# Patient Record
Sex: FEMALE | Race: BLACK OR AFRICAN AMERICAN | NOT HISPANIC OR LATINO | URBAN - METROPOLITAN AREA
[De-identification: names, ages, dates, MRNs, and addresses within clinical notes are randomized per-mention and may not be internally consistent; named-entity substitution may affect disease eponyms.]

---

## 2017-05-19 ENCOUNTER — OFFICE VISIT (OUTPATIENT)
Dept: FAMILY MEDICINE | Facility: CLINIC | Age: 47
End: 2017-05-19

## 2017-05-19 VITALS
SYSTOLIC BLOOD PRESSURE: 118 MMHG | RESPIRATION RATE: 16 BRPM | TEMPERATURE: 98.3 F | HEIGHT: 64 IN | DIASTOLIC BLOOD PRESSURE: 81 MMHG | BODY MASS INDEX: 28.71 KG/M2 | OXYGEN SATURATION: 100 % | HEART RATE: 82 BPM | WEIGHT: 168.2 LBS

## 2017-05-19 DIAGNOSIS — E55.9 VITAMIN D DEFICIENCY: ICD-10-CM

## 2017-05-19 DIAGNOSIS — N93.9 ABNORMAL UTERINE BLEEDING: Primary | ICD-10-CM

## 2017-05-19 DIAGNOSIS — D50.9 MICROCYTIC ANEMIA: ICD-10-CM

## 2017-05-19 LAB
HCG UR QL: NEGATIVE
HCT VFR BLD AUTO: 33.6 % (ref 35–47)
HEMOGLOBIN: 10.4 G/DL (ref 11.7–15.7)
MCH RBC QN AUTO: 23.5 PG (ref 26.5–35)
MCHC RBC AUTO-ENTMCNC: 31 G/DL (ref 32–36)
MCV RBC AUTO: 75.9 FL (ref 78–100)
PLATELET # BLD AUTO: 280 K/UL (ref 150–450)
RBC # BLD AUTO: 4.43 M/UL (ref 3.8–5.2)
WBC # BLD AUTO: 5.2 K/UL (ref 4–11)

## 2017-05-19 RX ORDER — MEDROXYPROGESTERONE ACETATE 10 MG
10 TABLET ORAL DAILY
Qty: 10 TABLET | Refills: 0 | Status: SHIPPED | OUTPATIENT
Start: 2017-05-19 | End: 2017-05-29

## 2017-05-19 RX ORDER — FERROUS GLUCONATE 324(38)MG
324 TABLET ORAL 2 TIMES DAILY
Qty: 90 TABLET | Refills: 1 | Status: SHIPPED | OUTPATIENT
Start: 2017-05-19 | End: 2018-04-26

## 2017-05-19 RX ORDER — MEDROXYPROGESTERONE ACETATE 10 MG
10 TABLET ORAL DAILY
Qty: 10 TABLET | Refills: 0 | Status: CANCELLED | OUTPATIENT
Start: 2017-05-19

## 2017-05-19 NOTE — MR AVS SNAPSHOT
After Visit Summary   2017    Mahogany Whitman    MRN: [id number]           Patient Information     Date Of Birth          1970        Visit Information        Provider Department      2017 2:00 PM Meka Gomes MD [facility]        Today's Diagnoses     Abnormal uterine bleeding    -  1    Microcytic anemia        Vitamin D deficiency           Follow-ups after your visit        Follow-up notes from your care team     Return in about 7 days (around 2017).      Your next 10 appointments already scheduled     May 25, 2017  2:20 PM CDT   Return Visit with MD [facility] ([facility])    [address],  [address]  [place] [address]   [phone]              Who to contact     Please call your clinic at [phone] to:    Ask questions about your health    Make or cancel appointments    Discuss your medicines    Learn about your test results    Speak to your doctor   If you have compliments or concerns about an experience at your clinic, or if you wish to file a complaint, please contact [facility] Patient Relations at [phone] or email us at [email or web address]         Additional Information About Your Visit        MyChart Information     Peeriot is an electronic gateway that provides easy, online access to your medical records. With Marketo, you can request a clinic appointment, read your test results, renew a prescription or communicate with your care team.     To sign up for Peeriot visit the website at [email or web address]   You will be asked to enter the access code listed below, as well as some personal information. Please follow the directions to create your username and password.     Your access code is: [email or web address]  Expires: 2017  7:27 PM     Your access code will  in 90 days. If you need help or a new code, please contact your  "[facility] or call [phone] for assistance.        Care EveryWhere ID     This is your Care EveryWhere ID. This could be used by other organizations to access your [place] medical records  [id number]        Your Vitals Were     Pulse Temperature Respirations Height Pulse Oximetry Breastfeeding?    82 98.3  F (36.8  C) (Oral) 16 5' 4\" (162.6 cm) 100% No    BMI (Body Mass Index)                   28.87 kg/m2            Blood Pressure from Last 3 Encounters:   05/19/17 118/81   09/29/16 102/77   06/14/16 98/67    Weight from Last 3 Encounters:   05/19/17 168 lb 3.2 oz (76.3 kg)   09/29/16 164 lb 2 oz (74.4 kg)   06/14/16 172 lb (78 kg)              We Performed the Following     CBC with Plt ([facility])     HCG Qualitative Urine (UPT) ([facility])     Surgical pathology exam          Today's Medication Changes          These changes are accurate as of: 5/19/17 11:59 PM.  If you have any questions, ask your nurse or doctor.               Start taking these medicines.        Dose/Directions    cholecalciferol 1000 UNIT tablet   Commonly known as:  vitamin D   Used for:  Vitamin D deficiency   Started by:  Meka Gomes MD        Dose:  1000 Units   Take 1 tablet (1,000 Units) by mouth daily   Quantity:  100 tablet   Refills:  3         These medicines have changed or have updated prescriptions.        Dose/Directions    * medroxyPROGESTERone 10 MG tablet   Commonly known as:  PROVERA   This may have changed:  Another medication with the same name was added. Make sure you understand how and when to take each.   Used for:  Abnormal uterine bleeding   Changed by:  Luisa Zepeda MD        Dose:  10 mg   Take 1 tablet (10 mg) by mouth daily   Quantity:  10 tablet   Refills:  0       * medroxyPROGESTERone 10 MG tablet   Commonly known as:  PROVERA   This may have changed:  You were already taking a medication with the same name, and this prescription was added. Make sure you " understand how and when to take each.   Used for:  Abnormal uterine bleeding   Changed by:  Meka Gomes MD        Dose:  10 mg   Take 1 tablet (10 mg) by mouth daily for 10 days   Quantity:  10 tablet   Refills:  0       * Notice:  This list has 2 medication(s) that are the same as other medications prescribed for you. Read the directions carefully, and ask your doctor or other care provider to review them with you.         Where to get your medicines      These medications were sent to [facility] [place], [place] - [address] [facility] [address]     Phone:  [phone]     cholecalciferol 1000 UNIT tablet    ferrous gluconate 324 (38 FE) MG tablet    medroxyPROGESTERone 10 MG tablet                Primary Care Provider Office Phone # Fax #    Corinna Neely [phone]       [facility] [address] [address]        Thank you!     Thank you for choosing [facility]  for your care. Our goal is always to provide you with excellent care. Hearing back from our patients is one way we can continue to improve our services. Please take a few minutes to complete the written survey that you may receive in the mail after your visit with us. Thank you!             Your Updated Medication List - Protect others around you: Learn how to safely use, store and throw away your medicines at www.disposemymeds.org.          This list is accurate as of: 5/19/17 11:59 PM.  Always use your most recent med list.                   Brand Name Dispense Instructions for use    cholecalciferol 1000 UNIT tablet    vitamin D    100 tablet    Take 1 tablet (1,000 Units) by mouth daily       docusate sodium 100 MG capsule    COLACE    60 capsule    Take 1-2 capsules daily as needed for constipation       ferrous gluconate 324 (38 FE) MG tablet    FERGON    90 tablet    Take 1 tablet (324 mg) by mouth 2 times daily       *  medroxyPROGESTERone 10 MG tablet    PROVERA    10 tablet    Take 1 tablet (10 mg) by mouth daily       * medroxyPROGESTERone 10 MG tablet    PROVERA    10 tablet    Take 1 tablet (10 mg) by mouth daily for 10 days       naproxen 500 MG tablet    NAPROSYN    60 tablet    Take 1 tablet (500 mg) by mouth 2 times daily (with meals)       * Notice:  This list has 2 medication(s) that are the same as other medications prescribed for you. Read the directions carefully, and ask your doctor or other care provider to review them with you.

## 2017-05-19 NOTE — LETTER
May 22, 2017      Mahogany Up J Carlos  [address] [facility] [address]        Dear Mahogany,    Thank you for getting your care at [facility]. Please see below for your test results. Low anemia but better that last time we got your level. Take the iron tablets you have , we will repeat hemoglobin in a month. This can just be a lab visit. Call  on 6/20/17 to schedule a lab visit.     Resulted Orders   HCG Qualitative Urine (UPT) ([facility])   Result Value Ref Range    HCG Qual Urine NEGATIVE Negative   CBC with Plt ([facility])   Result Value Ref Range    WBC 5.2 4.0 - 11.0 K/uL    RBC 4.43 3.80 - 5.20 M/uL    Hemoglobin 10.4 (L) 11.7 - 15.7 g/dL    Hematocrit 33.6 (L) 35.0 - 47.0 %    MCV 75.9 (L) 78.0 - 100.0 fL    MCH 23.5 (L) 26.5 - 35.0 pg    MCHC 31.0 (L) 32.0 - 36.0 g/dL    Platelets 280.0 150.0 - 450.0 K/uL       If you have any concerns about these results please call and leave a message for me or send a MyChart message to the clinic.    Sincerely,    Meka Gomes MD

## 2017-05-19 NOTE — PROGRESS NOTES
Preceptor Attestation:   Patient seen and discussed with the resident. Assessment and plan reviewed with resident and agreed upon.   Supervising Physician:  Carlos Baron MD  [facility]

## 2017-05-22 DIAGNOSIS — D62 ANEMIA DUE TO BLOOD LOSS, ACUTE: Primary | ICD-10-CM

## 2017-05-24 LAB — COPATH REPORT: NORMAL

## 2017-05-25 ENCOUNTER — OFFICE VISIT (OUTPATIENT)
Dept: FAMILY MEDICINE | Facility: CLINIC | Age: 47
End: 2017-05-25

## 2017-05-25 VITALS
OXYGEN SATURATION: 99 % | SYSTOLIC BLOOD PRESSURE: 96 MMHG | DIASTOLIC BLOOD PRESSURE: 67 MMHG | WEIGHT: 165 LBS | RESPIRATION RATE: 20 BRPM | HEART RATE: 79 BPM | TEMPERATURE: 98.4 F | BODY MASS INDEX: 28.32 KG/M2

## 2017-05-25 DIAGNOSIS — D50.0 IRON DEFICIENCY ANEMIA DUE TO CHRONIC BLOOD LOSS: ICD-10-CM

## 2017-05-25 DIAGNOSIS — M17.0 OSTEOARTHRITIS OF BOTH KNEES, UNSPECIFIED OSTEOARTHRITIS TYPE: ICD-10-CM

## 2017-05-25 DIAGNOSIS — N93.9 ABNORMAL VAGINAL BLEEDING: Primary | ICD-10-CM

## 2017-05-25 NOTE — PATIENT INSTRUCTIONS
Here is the plan from today's visit    1. Abnormal vaginal bleeding  - Hemoglobin (HGB) ([facility]); Future  -If this happens again I will refer you to Ob gyne     2. Iron deficiency anemia due to chronic blood loss  - Hemoglobin (HGB) ([facility]); Future  -Continue your iron for 1-2 months     3. Post-traumatic osteoarthritis of both knees  - Ibuprofen as needed   -SALOMPAS as needed   - Warm packs as needed     At [facility]   Phone:  [phone] [address] [facility] [address] this location    Please call or return to clinic if your symptoms don't go away.    Follow up plan    In 3 months   Thank you for coming to [facility] today.  Lab Testing:  **If you had lab testing today and your results are reassuring or normal they will be mailed to you or sent through [employer] within 7 days.   **If the lab tests need quick action we will call you with the results.  The phone number we will call with results is # [phone] (home) . If this is not the best number please call our clinic and change the number.  Medication Refills:  If you need any refills please call your pharmacy and they will contact us.   If you need to  your refill at a new pharmacy, please contact the new pharmacy directly. The new pharmacy will help you get your medications transferred faster.   Scheduling:  If you have any concerns about today's visit or wish to schedule another appointment please call our office during normal business hours [phone] (8-5:00 M-F)  If a referral was made to a [facility] and you don't get a call from central scheduling please call [phone].  If a Mammogram was ordered for you at [facility] call [phone] to schedule or change your appointment.  If you had an XRay/CT/Ultrasound/MRI ordered the number is [phone] to schedule or change your radiology appointment.   Medical Concerns:  If you have urgent medical concerns please call  [phone] at any time of the day.

## 2017-05-25 NOTE — MR AVS SNAPSHOT
After Visit Summary   5/25/2017    Mahogany Whitman    MRN: [id number]           Patient Information     Date Of Birth          1970        Visit Information        Provider Department      5/25/2017 2:20 PM Meka Gomes MD [facility]        Today's Diagnoses     Abnormal vaginal bleeding    -  1    Iron deficiency anemia due to chronic blood loss        Post-traumatic osteoarthritis of both knees          Care Instructions    Here is the plan from today's visit    1. Abnormal vaginal bleeding  - Hemoglobin (HGB) ([facility]); Future  -If this happens again I will refer you to Ob gyne     2. Iron deficiency anemia due to chronic blood loss  - Hemoglobin (HGB) ([facility]); Future  -Continue your iron for 1-2 months     3. Post-traumatic osteoarthritis of both knees  - Ibuprofen as needed   -SALOMPAS as needed   - Warm packs as needed     At [facility]   Phone:  [phone] [facility] [address] this location    Please call or return to clinic if your symptoms don't go away.    Follow up plan    In 3 months   Thank you for coming to [facility] today.  Lab Testing:  **If you had lab testing today and your results are reassuring or normal they will be mailed to you or sent through [employer] within 7 days.   **If the lab tests need quick action we will call you with the results.  The phone number we will call with results is # [phone] (home) . If this is not the best number please call our clinic and change the number.  Medication Refills:  If you need any refills please call your pharmacy and they will contact us.   If you need to  your refill at a new pharmacy, please contact the new pharmacy directly. The new pharmacy will help you get your medications transferred faster.   Scheduling:  If you have any concerns about today's visit or wish to schedule another appointment please call our office during normal business hours  [phone] (8-5:00 M-F)  If a referral was made to a [facility] and you don't get a call from central scheduling please call [phone].  If a Mammogram was ordered for you at [facility] call [phone] to schedule or change your appointment.  If you had an XRay/CT/Ultrasound/MRI ordered the number is [phone] to schedule or change your radiology appointment.   Medical Concerns:  If you have urgent medical concerns please call [phone] at any time of the day.            Follow-ups after your visit        Additional Services     PHYSICAL THERAPY REFERRAL - EXTERNAL       At [facility]   Phone:  [phone] [address] [facility] [address] this location                  Future tests that were ordered for you today     Open Future Orders        Priority Expected Expires Ordered    Hemoglobin (HGB) ([facility]) Routine 7/17/2017 9/25/2017 5/25/2017            Who to contact     Please call your clinic at [phone] to:    Ask questions about your health    Make or cancel appointments    Discuss your medicines    Learn about your test results    Speak to your doctor   If you have compliments or concerns about an experience at your clinic, or if you wish to file a complaint, please contact [facility] Patient Relations at [phone] or email us at [email or web address]         Additional Information About Your Visit        Taykeyhart Information     Mediust is an electronic gateway that provides easy, online access to your medical records. With SeatSwapr, you can request a clinic appointment, read your test results, renew a prescription or communicate with your care team.     To sign up for Mediust visit the website at [email or web address] Textbook Timet   You will be asked to enter the access code listed below, as well as some personal information. Please follow the directions to create your username and password.     Your  access code is: [email or web address]  Expires: 2017  7:27 PM     Your access code will  in 90 days. If you need help or a new code, please contact your [facility] or call [phone] for assistance.        Care EveryWhere ID     This is your Care EveryWhere ID. This could be used by other organizations to access your [place] medical records  [id number]        Your Vitals Were     Pulse Temperature Respirations Pulse Oximetry BMI (Body Mass Index)       79 98.4  F (36.9  C) (Oral) 20 99% 28.32 kg/m2        Blood Pressure from Last 3 Encounters:   17 96/67   17 118/81   16 102/77    Weight from Last 3 Encounters:   17 165 lb (74.8 kg)   17 168 lb 3.2 oz (76.3 kg)   16 164 lb 2 oz (74.4 kg)              We Performed the Following     PHYSICAL THERAPY REFERRAL - EXTERNAL        Primary Care Provider Office Phone # Fax #    Corinna Neely [phone]       [facility] [address] [address]        Thank you!     Thank you for choosing [facility]  for your care. Our goal is always to provide you with excellent care. Hearing back from our patients is one way we can continue to improve our services. Please take a few minutes to complete the written survey that you may receive in the mail after your visit with us. Thank you!             Your Updated Medication List - Protect others around you: Learn how to safely use, store and throw away your medicines at www.disposemymeds.org.          This list is accurate as of: 17  3:10 PM.  Always use your most recent med list.                   Brand Name Dispense Instructions for use    cholecalciferol 1000 UNIT tablet    vitamin D    100 tablet    Take 1 tablet (1,000 Units) by mouth daily       docusate sodium 100 MG capsule    COLACE    60 capsule    Take 1-2 capsules daily as needed for constipation       ferrous gluconate 324  (38 FE) MG tablet    FERGON    90 tablet    Take 1 tablet (324 mg) by mouth 2 times daily       * medroxyPROGESTERone 10 MG tablet    PROVERA    10 tablet    Take 1 tablet (10 mg) by mouth daily       * medroxyPROGESTERone 10 MG tablet    PROVERA    10 tablet    Take 1 tablet (10 mg) by mouth daily for 10 days       naproxen 500 MG tablet    NAPROSYN    60 tablet    Take 1 tablet (500 mg) by mouth 2 times daily (with meals)       * Notice:  This list has 2 medication(s) that are the same as other medications prescribed for you. Read the directions carefully, and ask your doctor or other care provider to review them with you.

## 2017-05-25 NOTE — PROGRESS NOTES
Preceptor Attestation:   Patient seen and discussed with the resident. Assessment and plan reviewed with resident and agreed upon.   Supervising Physician:  Carlos Baron MD  [facility]

## 2017-06-01 NOTE — PROGRESS NOTES
HPI       Mahogany Whitman is a 46 year old  female  who presents for the following:     Vaginal Bleeding Follow up  Bleeding has stopped   I have reviewed result of the histopathology with her. Negative product of conception and malignancy     Acute on Chronic bilateral Knee   -Has been hurting past couple of weeks   -Had same symptoms in the past then went away  -No fever, has not noticed any swelling or redness  -No recent trauma     No Known Allergies    Problem, Medication and Allergy Lists were reviewed and are current.  reviewed and updated if needed..    Patient is an established patient of this clinic..         Review of Systems:   General: No distress  See HPI               Physical Exam:     Vitals:    05/25/17 1441   BP: 96/67   BP Location: Left arm   Patient Position: Chair   Cuff Size: Adult Regular   Pulse: 79   Resp: 20   Temp: 98.4  F (36.9  C)   TempSrc: Oral   SpO2: 99%   Weight: 165 lb (74.8 kg)     Body mass index is 28.32 kg/(m^2).    Constitutional: Awake, alert, cooperative, no apparent distress, and appears stated age  MSK:  Bilateral knees negative redness, no effusion, Positive bilateral medial joint line tenderness, Negative ligament laxity,   Gait normal .    No results found for this or any previous visit (from the past 24 hour(s)).  No labs done today     Assessment and Plan        1. Abnormal vaginal bleeding  - Hemoglobin (HGB) ([facility]); Future  -If this happens again I will refer you to OB gyne     2. Iron deficiency anemia due to chronic blood loss  - Hemoglobin (HGB) ([facility]); Future  -Continue your iron for 1-2 months     3. Post-traumatic osteoarthritis of both knees  - Ibuprofen as needed   -SALOMPAS as needed   - Warm packs as needed     At [facility]   Phone:  [phone] [address]  [facility] [address] this location    Please call or return to clinic if your symptoms don't go away.    Follow up plan    In 3 months     There are no  discontinued medications.    Options for treatment and follow-up care were reviewed with the patient. Mahogany Whitman  engaged in the decision making process and verbalized understanding of the options discussed and agreed with the final plan.    Meka Gomes MD G3  [facility]  Family Medicine Resident  Pager [phone]

## 2017-06-10 ENCOUNTER — HEALTH MAINTENANCE LETTER (OUTPATIENT)
Age: 47
End: 2017-06-10

## 2017-06-14 ENCOUNTER — OFFICE VISIT (OUTPATIENT)
Dept: FAMILY MEDICINE | Facility: CLINIC | Age: 47
End: 2017-06-14

## 2017-06-14 VITALS
RESPIRATION RATE: 20 BRPM | OXYGEN SATURATION: 98 % | SYSTOLIC BLOOD PRESSURE: 102 MMHG | WEIGHT: 162 LBS | DIASTOLIC BLOOD PRESSURE: 66 MMHG | TEMPERATURE: 98.3 F | BODY MASS INDEX: 27.81 KG/M2 | HEART RATE: 80 BPM

## 2017-06-14 DIAGNOSIS — N92.6 IRREGULAR MENSTRUAL CYCLE: Primary | ICD-10-CM

## 2017-06-14 DIAGNOSIS — E55.9 VITAMIN D DEFICIENCY: ICD-10-CM

## 2017-06-14 DIAGNOSIS — E56.9 VITAMIN DEFICIENCY: ICD-10-CM

## 2017-06-14 NOTE — LETTER
June 16, 2017      Mahogany Up J Carlos  [address] [facility] [address]        Dear Mahogany,    Thank you for getting your care at [facility]. Please see below for your test results. Sent high dose Vitamin D at [facility].     Resulted Orders   Vitamin D Deficiency   Result Value Ref Range    Vitamin D Deficiency screening 18 (L) 20 - 75 ug/L      Comment:      Season, race, dietary intake, and treatment affect the concentration of   25-hydroxy-Vitamin D. Values may decrease during winter months and increase   during summer months. Values 20-29 ug/L may indicate Vitamin D insufficiency   and values <20 ug/L may indicate Vitamin D deficiency.   Vitamin D determination is routinely performed by an immunoassay specific for   25 hydroxyvitamin D3.  If an individual is on vitamin D2 (ergocalciferol)   supplementation, please specify 25 OH vitamin D2 and D3 level determination   by   LCMSMS test VITD23.         If you have any concerns about these results please call and leave a message for me or send a MyChart message to the clinic.    Sincerely,    Meka Gomes MD

## 2017-06-14 NOTE — MR AVS SNAPSHOT
After Visit Summary   6/14/2017    Mahogany Whitman    MRN: [id number]           Patient Information     Date Of Birth          1970        Visit Information        Provider Department      6/14/2017 2:00 PM Meka Gomes MD [facility]        Today's Diagnoses     Irregular menstrual cycle    -  1    Vitamin deficiency          Care Instructions    Here is the plan from today's visit    1. Irregular menstrual cycle  - To figure out cause of irregular bleeding   - OB/GYN REFERRAL - INTERNAL    2. Vitamin deficiency  - Vitamin D Deficiency, will do labs   -If very low I will send a high dose Vitamin D to your pharmacy     Please call or return to clinic if your symptoms don't go away.    Follow up plan  As needed     Thank you for coming to [facility] today.  Lab Testing:  **If you had lab testing today and your results are reassuring or normal they will be mailed to you or sent through Rendeevoo within 7 days.   **If the lab tests need quick action we will call you with the results.  The phone number we will call with results is # [phone] (home) . If this is not the best number please call our clinic and change the number.  Medication Refills:  If you need any refills please call your pharmacy and they will contact us.   If you need to  your refill at a new pharmacy, please contact the new pharmacy directly. The new pharmacy will help you get your medications transferred faster.   Scheduling:  If you have any concerns about today's visit or wish to schedule another appointment please call our office during normal business hours [phone] (8-5:00 M-F)  If a referral was made to a [facility] and you don't get a call from central scheduling please call [phone].  If a Mammogram was ordered for you at [facility] call [phone] to schedule or change your appointment.  If you had an XRay/CT/Ultrasound/MRI ordered the number  is [phone] to schedule or change your radiology appointment.   Medical Concerns:  If you have urgent medical concerns please call [phone] at any time of the day.            Follow-ups after your visit        Additional Services     OB/GYN REFERRAL - INTERNAL       Your provider has referred you to:  GAY: [facility] [phone]   [email or web address]    Please be aware that coverage of these services is subject to the terms and limitations of your health insurance plan.  Call member services at your health plan with any benefit or coverage questions.      Please bring the following with you to your appointment:    (1) Any X-Rays, CTs or MRIs which have been performed.  Contact the facility where they were done to arrange for  prior to your scheduled appointment.   (2) List of current medications   (3) This referral request   (4) Any documents/labs given to you for this referral                  Who to contact     Please call your clinic at [phone] to:    Ask questions about your health    Make or cancel appointments    Discuss your medicines    Learn about your test results    Speak to your doctor   If you have compliments or concerns about an experience at your clinic, or if you wish to file a complaint, please contact [facility] Patient Relations at [phone] or email us at [email or web address]         Additional Information About Your Visit        MobileIronhart Information     Jiujiuweikangt is an electronic gateway that provides easy, online access to your medical records. With Skimble, you can request a clinic appointment, read your test results, renew a prescription or communicate with your care team.     To sign up for Jiujiuweikangt visit the website at [email or web address]   You will be asked to enter the access code listed below, as well as some personal information. Please follow the directions to  create your username and password.     Your access code is: [email or web address]  Expires: 2017  7:27 PM     Your access code will  in 90 days. If you need help or a new code, please contact your [facility] or call [phone] for assistance.        Care EveryWhere ID     This is your Care EveryWhere ID. This could be used by other organizations to access your [place] medical records  [id number]        Your Vitals Were     Pulse Temperature Respirations Last Period Pulse Oximetry Breastfeeding?    80 98.3  F (36.8  C) (Oral) 20 2017 98% No    BMI (Body Mass Index)                   27.81 kg/m2            Blood Pressure from Last 3 Encounters:   17 102/66   17 96/67   17 118/81    Weight from Last 3 Encounters:   17 162 lb (73.5 kg)   17 165 lb (74.8 kg)   17 168 lb 3.2 oz (76.3 kg)              We Performed the Following     OB/GYN REFERRAL - INTERNAL     Vitamin D Deficiency        Primary Care Provider Office Phone # Fax #    Corinna Neely [phone]       [facility] [address] [address]        Thank you!     Thank you for choosing [facility]  for your care. Our goal is always to provide you with excellent care. Hearing back from our patients is one way we can continue to improve our services. Please take a few minutes to complete the written survey that you may receive in the mail after your visit with us. Thank you!             Your Updated Medication List - Protect others around you: Learn how to safely use, store and throw away your medicines at www.disposemymeds.org.          This list is accurate as of: 17  2:20 PM.  Always use your most recent med list.                   Brand Name Dispense Instructions for use    cholecalciferol 1000 UNIT tablet    vitamin D    100 tablet    Take 1 tablet (1,000 Units) by mouth daily       docusate sodium 100  MG capsule    COLACE    60 capsule    Take 1-2 capsules daily as needed for constipation       ferrous gluconate 324 (38 FE) MG tablet    FERGON    90 tablet    Take 1 tablet (324 mg) by mouth 2 times daily       naproxen 500 MG tablet    NAPROSYN    60 tablet    Take 1 tablet (500 mg) by mouth 2 times daily (with meals)

## 2017-06-14 NOTE — PROGRESS NOTES
Preceptor Attestation:   Patient seen and discussed with the resident. Assessment and plan reviewed with resident and agreed upon.   Supervising Physician:  Steven Lion MD  [facility]

## 2017-06-14 NOTE — PATIENT INSTRUCTIONS
Here is the plan from today's visit    1. Irregular menstrual cycle  - To figure out cause of irregular bleeding   - OB/GYN REFERRAL - INTERNAL    2. Vitamin deficiency  - Vitamin D Deficiency, will do labs   -If very low I will send a high dose Vitamin D to your pharmacy     Please call or return to clinic if your symptoms don't go away.    Follow up plan  As needed     Thank you for coming to [facility] today.  Lab Testing:  **If you had lab testing today and your results are reassuring or normal they will be mailed to you or sent through Amonix within 7 days.   **If the lab tests need quick action we will call you with the results.  The phone number we will call with results is # [phone] (home) . If this is not the best number please call our clinic and change the number.  Medication Refills:  If you need any refills please call your pharmacy and they will contact us.   If you need to  your refill at a new pharmacy, please contact the new pharmacy directly. The new pharmacy will help you get your medications transferred faster.   Scheduling:  If you have any concerns about today's visit or wish to schedule another appointment please call our office during normal business hours [phone] (8-5:00 M-F)  If a referral was made to a [facility] and you don't get a call from central scheduling please call [phone].  If a Mammogram was ordered for you at [facility] call [phone] to schedule or change your appointment.  If you had an XRay/CT/Ultrasound/MRI ordered the number is [phone] to schedule or change your radiology appointment.   Medical Concerns:  If you have urgent medical concerns please call [phone] at any time of the day.      [facility]  [phone]  Hi, pt is scheduled 8/3 at [facility]           Thanks     Vernon

## 2017-06-15 LAB — DEPRECATED CALCIDIOL+CALCIFEROL SERPL-MC: 18 UG/L (ref 20–75)

## 2017-06-16 NOTE — PROGRESS NOTES
HPI       Mahogany Whitman is a 46 year old  female  who presents for the following:     Abnormal Uterine Bleeding follow up:   -Patent Initial heavy bleeding stopped after taking 7 days of progesterone last 17.   -Started Bleeding again last  , took remaining 3 progesterone last  and bleeding stopped   -No bleeding today, no abdominal pain, difficulty urinating  -Appetite , bowel movement , sleep and weight is fine.     Wants Vit D level check     Taking Iron for anemia- denies any dizziness, chest pressure, heaviness or palpitation    Problem, Medication and Allergy Lists were reviewed and are current.  reviewed and updated if needed..    Patient is an established patient of this clinic..         Review of Systems:   General: No distress  Negative except for HPI             Physical Exam:     Vitals:    17 1354   BP: 102/66   BP Location: Left arm   Patient Position: Chair   Cuff Size: Adult Regular   Pulse: 80   Resp: 20   Temp: 98.3  F (36.8  C)   TempSrc: Oral   SpO2: 98%   Weight: 162 lb (73.5 kg)     Body mass index is 27.81 kg/(m^2).    Constitutional: Awake, alert, cooperative, no apparent distress, and appears stated age  Psych : Good eye contact, well groomed, very interactive and collaborative. Good insight. Thought process is linear and coherent. Associations are tight. Mood is good. Affect euthymic.     No results found for this or any previous visit (from the past 24 hour(s)).  No labs done today     Assessment and Plan      1. Irregular menstrual cycle, needs atypical hyperplasia rule out   Specimen form uterus does not show atypia , Last endometrial biopsy showed the followin   COMMENT:   The findings are suggestive of exogenous hormone effect.  Clinical   correlation is recommended.  Since there are areas of glandular crowding   that are not sufficient for a diagnosis of simple hyperplasia in a   background of secretory changes, follow up is recommended.   Should the   bleeding persist, repeat biopsy during the proliferative phase is   recommended especially if the patient has risk factors to develop   endometrial hyperplasia.     - To figure out cause of irregular bleeding .  - OB/GYN REFERRAL - INTERNAL    2. Vitamin deficiency  - Vitamin D Deficiency, will do labs   -If very low I will send a high dose Vitamin D to your pharmacy     Please call or return to clinic if your symptoms don't go away.    Follow up plan  As needed       Medications Discontinued During This Encounter   Medication Reason     medroxyPROGESTERone (PROVERA) 10 MG tablet Medication Reconciliation Clean Up       Options for treatment and follow-up care were reviewed with the patient. Mahogany Whitman  engaged in the decision making process and verbalized understanding of the options discussed and agreed with the final plan.    Meka Gomes MD G3  [facility]  Family Medicine Resident  Pager [phone]

## 2017-08-11 ENCOUNTER — OFFICE VISIT (OUTPATIENT)
Dept: OBGYN | Facility: CLINIC | Age: 47
End: 2017-08-11
Attending: OBSTETRICS & GYNECOLOGY
Payer: COMMERCIAL

## 2017-08-11 VITALS
SYSTOLIC BLOOD PRESSURE: 99 MMHG | HEART RATE: 96 BPM | BODY MASS INDEX: 26.72 KG/M2 | WEIGHT: 156.5 LBS | HEIGHT: 64 IN | DIASTOLIC BLOOD PRESSURE: 66 MMHG

## 2017-08-11 DIAGNOSIS — N88.8 FRIABLE CERVIX: ICD-10-CM

## 2017-08-11 DIAGNOSIS — N92.4 EXCESSIVE BLEEDING IN PREMENOPAUSAL PERIOD: ICD-10-CM

## 2017-08-11 DIAGNOSIS — N92.1 METRORRHAGIA: Primary | ICD-10-CM

## 2017-08-11 LAB
BASOPHILS # BLD AUTO: 0 10E9/L (ref 0–0.2)
BASOPHILS NFR BLD AUTO: 0.4 %
DIFFERENTIAL METHOD BLD: ABNORMAL
EOSINOPHIL # BLD AUTO: 0.1 10E9/L (ref 0–0.7)
EOSINOPHIL NFR BLD AUTO: 2.5 %
ERYTHROCYTE [DISTWIDTH] IN BLOOD BY AUTOMATED COUNT: 18.2 % (ref 10–15)
HCG UR QL: NEGATIVE
HCT VFR BLD AUTO: 31.8 % (ref 35–47)
HGB BLD-MCNC: 9.8 G/DL (ref 11.7–15.7)
IMM GRANULOCYTES # BLD: 0 10E9/L (ref 0–0.4)
IMM GRANULOCYTES NFR BLD: 0.2 %
INTERNAL QC OK POCT: YES
LYMPHOCYTES # BLD AUTO: 2.1 10E9/L (ref 0.8–5.3)
LYMPHOCYTES NFR BLD AUTO: 46.1 %
MCH RBC QN AUTO: 22.3 PG (ref 26.5–33)
MCHC RBC AUTO-ENTMCNC: 30.8 G/DL (ref 31.5–36.5)
MCV RBC AUTO: 72 FL (ref 78–100)
MONOCYTES # BLD AUTO: 0.4 10E9/L (ref 0–1.3)
MONOCYTES NFR BLD AUTO: 8.5 %
NEUTROPHILS # BLD AUTO: 1.9 10E9/L (ref 1.6–8.3)
NEUTROPHILS NFR BLD AUTO: 42.3 %
NRBC # BLD AUTO: 0 10*3/UL
NRBC BLD AUTO-RTO: 0 /100
PLATELET # BLD AUTO: 251 10E9/L (ref 150–450)
PROLACTIN SERPL-MCNC: 4 UG/L (ref 3–27)
RBC # BLD AUTO: 4.4 10E12/L (ref 3.8–5.2)
TSH SERPL DL<=0.005 MIU/L-ACNC: 0.78 MU/L (ref 0.4–4)
WBC # BLD AUTO: 4.5 10E9/L (ref 4–11)

## 2017-08-11 PROCEDURE — 85025 COMPLETE CBC W/AUTO DIFF WBC: CPT | Performed by: NURSE PRACTITIONER

## 2017-08-11 PROCEDURE — 84443 ASSAY THYROID STIM HORMONE: CPT | Performed by: NURSE PRACTITIONER

## 2017-08-11 PROCEDURE — 87624 HPV HI-RISK TYP POOLED RSLT: CPT | Performed by: NURSE PRACTITIONER

## 2017-08-11 PROCEDURE — 99212 OFFICE O/P EST SF 10 MIN: CPT | Mod: 25

## 2017-08-11 PROCEDURE — 88305 TISSUE EXAM BY PATHOLOGIST: CPT | Performed by: NURSE PRACTITIONER

## 2017-08-11 PROCEDURE — G0476 HPV COMBO ASSAY CA SCREEN: HCPCS | Performed by: NURSE PRACTITIONER

## 2017-08-11 PROCEDURE — 58100 BIOPSY OF UTERUS LINING: CPT | Mod: ZF | Performed by: NURSE PRACTITIONER

## 2017-08-11 PROCEDURE — 81025 URINE PREGNANCY TEST: CPT | Mod: ZF | Performed by: NURSE PRACTITIONER

## 2017-08-11 PROCEDURE — 84146 ASSAY OF PROLACTIN: CPT | Performed by: NURSE PRACTITIONER

## 2017-08-11 PROCEDURE — 36415 COLL VENOUS BLD VENIPUNCTURE: CPT | Performed by: NURSE PRACTITIONER

## 2017-08-11 PROCEDURE — 88175 CYTOPATH C/V AUTO FLUID REDO: CPT | Performed by: NURSE PRACTITIONER

## 2017-08-11 ASSESSMENT — PAIN SCALES - GENERAL: PAINLEVEL: NO PAIN (0)

## 2017-08-11 NOTE — PATIENT INSTRUCTIONS
Please go to the lab today after this appointment  An endometrial biopsy was done today.  You will be called with the results in 7-10 days.  Please schedule a pelvic ultrasound in 2-3 weeks for further evaluation of your bleeding.  A plan of care will be determined when this has been completed.  May take ibuprofen 400-800 mg up to three times per day as needed for cramping.  Notify provider if you have an increase in the heaviness of your bleeding or are soaking a pad in an hour.  Also notify provider if you have severe cramping or abnormal vaginal discharge.

## 2017-08-11 NOTE — MR AVS SNAPSHOT
After Visit Summary   8/11/2017    Mahogany Whitman    MRN: [id number]           Patient Information     Date Of Birth          1970        Visit Information        Provider Department      8/11/2017 2:30 PM Annelise Guillen APRN CNP [facility]        Today's Diagnoses     Metrorrhagia        Excessive bleeding in premenopausal period          Care Instructions    Please go to the lab today after this appointment  An endometrial biopsy was done today.  You will be called with the results in 7-10 days.  Please schedule a pelvic ultrasound in 2-3 weeks for further evaluation of your bleeding.  A plan of care will be determined when this has been completed.          Follow-ups after your visit        Future tests that were ordered for you today     Open Future Orders        Priority Expected Expires Ordered    US GYN Pelvic, Complete (In Clinic) [[id number]] Routine  9/25/2017 8/11/2017            Who to contact     Please call your clinic at [phone] to:    Ask questions about your health    Make or cancel appointments    Discuss your medicines    Learn about your test results    Speak to your doctor   If you have compliments or concerns about an experience at your clinic, or if you wish to file a complaint, please contact [facility] Patient Relations at [phone] or email us at [email or web address]         Additional Information About Your Visit        MyChart Information     [employer] is an electronic gateway that provides easy, online access to your medical records. With [employer], you can request a clinic appointment, read your test results, renew a prescription or communicate with your care team.     To sign up for Cheft visit the website at [email or web address]   You will be asked to enter the access code listed below, as well as some personal information. Please follow the directions to create your username and  "password.     Your access code is: [email or web address]  Expires: 2017  7:27 PM     Your access code will  in 90 days. If you need help or a new code, please contact your [facility] or call [phone] for assistance.        Care EveryWhere ID     This is your Care EveryWhere ID. This could be used by other organizations to access your [place] medical records  [id number]        Your Vitals Were     Pulse Height Last Period BMI (Body Mass Index)          96 1.626 m (5' 4.02\") 2017 26.85 kg/m2         Blood Pressure from Last 3 Encounters:   17 99/66   17 102/66   17 96/67    Weight from Last 3 Encounters:   17 71 kg (156 lb 8 oz)   17 73.5 kg (162 lb)   17 74.8 kg (165 lb)              We Performed the Following     CBC with Platelets Differential     Endometrial Biopsy without Cervical Dilation [25251]     hCG qual urine POCT     HPV High Risk Types DNA Cervical     Pap imaged thin layer diagnostic with HPV     Prolactin     Surgical pathology exam [[id number]]     TSH with free T4 reflex        Primary Care Provider Office Phone # Fax #    Corinna Karson Neely [phone]       [address] [address]        Equal Access to Services     DIEGO ARGUETA : Hadii reena musa hadasho Sorakan, waaxda luqadaha, qaybta kaalmada sari, radhames bautista . So [facility] [phone].    ATENCIÓN: Si habla español, tiene a iverson disposición servicios gratuitos de asistencia lingüística. Mima al [phone].    We comply with applicable federal civil rights laws and [place] laws. We do not discriminate on the basis of race, color, national origin, age, disability sex, sexual orientation or gender identity.            Thank you!     Thank you for choosing [facility]  for your care. Our goal is always to provide you with excellent care. Hearing back from our patients is " one way we can continue to improve our services. Please take a few minutes to complete the written survey that you may receive in the mail after your visit with us. Thank you!             Your Updated Medication List - Protect others around you: Learn how to safely use, store and throw away your medicines at www.disposemymeds.org.          This list is accurate as of: 8/11/17  3:18 PM.  Always use your most recent med list.                   Brand Name Dispense Instructions for use Diagnosis    * cholecalciferol 1000 UNIT tablet    vitamin D    100 tablet    Take 1 tablet (1,000 Units) by mouth daily    Vitamin D deficiency       * cholecalciferol 41610 UNITS capsule    VITAMIN D3    8 capsule    Take 1 capsule (50,000 Units) by mouth once a week    Vitamin D deficiency       docusate sodium 100 MG capsule    COLACE    60 capsule    Take 1-2 capsules daily as needed for constipation    Chronic constipation       ferrous gluconate 324 (38 FE) MG tablet    FERGON    90 tablet    Take 1 tablet (324 mg) by mouth 2 times daily    Abnormal uterine bleeding       * Notice:  This list has 2 medication(s) that are the same as other medications prescribed for you. Read the directions carefully, and ask your doctor or other care provider to review them with you.

## 2017-08-11 NOTE — PROGRESS NOTES
"Mahogany Whitman is a 46 yr old female, , who presents for evaluation of abnormal uterine bleeding.  She was referred from [facility].    Mahogany states that her abnormal bleeding started 1 year ago.  She notes that she has menses are regular intervals with heavy bleeding that lasts 3-4 days, changing her tampon 3-4 times per day.  She has intermenstrual bleeding for 3+ weeks between periods, though, with light and \"stringy\"/ \"sticky\" blood.  Occasionally notes the blood present in her underwear/ panty liner, but most often notes it when she sits to void. Has very few days with no bleeding at all.  Only spotting present today.  Denies dysmenorrhea or pelvic pain.  Sexually active in a monogamous relationship; denies dyspareunia.  Using condoms for contraception.  Mahogany has a prescription for Progesterone to be taken for 10 days when she has heavy bleeding; she has not been taking this regularly (last taken ~2 months ago).      Mahogany states her last \"true\" menstrual period was 2017, but she has had intermittent bleeding, most days, since then. Has not taken anything to try to resolve this.    Mahogany denies fever, SOB, dizziness, weakness, or lightheadedness.  Denies symptoms of a UTI.  Mahogany has not had a recent pelvic ultrasound.  She had an endometrial biopsy done in  due to heavy menstrual bleeding; the result showed:  FINAL DIAGNOSIS:   Uterus, endometrial biopsy:         - Inactive to weakly secretory endometrium with stromal predecidualization (see comment)        - Foci of stromal breakdown.     COMMENT:   The findings are suggestive of exogenous hormone effect.  Clinical   correlation is recommended.  Since there are areas of glandular crowding   that are not sufficient for a diagnosis of simple hyperplasia in a   background of secretory changes, follow up is recommended.  Should the   bleeding persist, repeat biopsy during the proliferative phase is   recommended especially if the patient has risk " "factors to develop   endometrial hyperplasia.     Last pap smear: 2011 NIL  CBC done on 5/19/2017: Hgb 10.4, normal platelets  - Mahogany was prescribed an iron supplement, but has not been taking it regularly    Current Outpatient Prescriptions   Medication     cholecalciferol (VITAMIN D3) 76997 UNITS capsule     ferrous gluconate (FERGON) 324 (38 FE) MG tablet     cholecalciferol (VITAMIN D) 1000 UNIT tablet     docusate sodium (COLACE) 100 MG capsule     No current facility-administered medications for this visit.      Past Medical History:   Diagnosis Date     Gestational diabetes      Past Surgical History:   Procedure Laterality Date     NO HISTORY OF SURGERY       OBJECTIVE:  BP 99/66 (BP Location: Left arm, Patient Position: Chair, Cuff Size: Adult Regular)  Pulse 96  Ht 1.626 m (5' 4.02\")  Wt 71 kg (156 lb 8 oz)  LMP 06/14/2017  BMI 26.85 kg/m2  General: pleasant female in no acute distress  Abdomen: soft, non-tender  Pelvic exam: infibulation; vagina and vulva without lesion; scant amount of brown-colored discharge present in vagina; cervix pale, friable with placement of speculum; without lesions or tenderness, uterus normal size, non-tender, adenxa normal in size without tenderness, pap smear done.    UPT: negative    ASSESSMENT:  Metrorrhagia  Menorrhagia (premenopausal)  Friable cervix    PLAN:  Endometrial biopsy recommended today; patient agreeable with this plan.  Labs ordered for evaluation of dysfunctional uterine bleeding: TSH, Prolactin, CBC, UPT (negative)  Pap smear (co-testing) completed today due to friability of cervix with placement of the speculum and last pap smear being done in 2011 (cytology alone, no HPV testing done at that time).  Patient to schedule a transvaginal ultrasound in 2 -3 weeks for further evaluation of cause of bleeding  Recommended that Mahogany start a hormonal method taken regularly to decrease her bleeding; patient declines at this time.  States she is not bothered by " "the amount of bleeding, but desires to understand the cause.  She is not interested in taking hormones at this time.  Patient agreed to revisit this discussion and the plan for care after her biopsy and ultrasound results are available.      Endometrial Biopsy    Menstrual History:  Menstrual History 2016   LAST MENSTRUAL PERIOD 2016     Time Out - \"Pause for the Cause\"  Just before the procedure begins, through verbal and active participation of team members, verify:                      Initials   Patient Name SLP   Patient  SLP   Procedure to be performed SLP                                                                                                                                   Indication: menometrorhagia    Pregnancy test:  Negative    Consent: Verbal consent obtained from patient. , Risks, benefits of treatment, and no treatment were discussed.  Patient's questions were elicited and answered. , Written consent signed and scanned into medical record. and Patient received and verbalized understanding of discharge instructions    Using a small  Wendy speculum, the cervix was visualized. The cervix was prepped with Betadine.  A single tooth tenaculum was applied to the anterior lip of the cervix at the 12 o'clock position. The endometrial pipelle was advanced through the cervix without difficulty and a sample collected. One additional pass was made.  The tenaculum was removed from the cervix and the tenaculum site made hemostatic with pressure.    EBL: minimal    Complications:  none  Pathology: EMB sample was sent to pathology.  Tolerance of Procedure:  Patient did tolerate the procedure well. Patient was provided with ibuprofen after the procedure for uterine cramping     Patient was instructed to call if she experiences any heavy bleeding, severe cramping, or abnormal vaginal discharge.  May take ibuprofen 400-800 mg PO TID PRN or naproxen 500 mg PO " BID for cramping.   Will notify patient of results.  See further plan for care above.    Annelise Guillen, HERSON, APRN, WHNP

## 2017-08-11 NOTE — LETTER
"2017       RE: Mahogany Whitman  [address] [facility] [address]     Dear Colleague,    Thank you for referring your patient, Mahogany Whitman, to the [facility] at [facility]. Please see a copy of my visit note below.    Mahogany Whitman is a 46 yr old female, , who presents for evaluation of abnormal uterine bleeding.  She was referred from [facility].    Mahogany states that her abnormal bleeding started 1 year ago.  She notes that she has menses are regular intervals with heavy bleeding that lasts 3-4 days, changing her tampon 3-4 times per day.  She has intermenstrual bleeding for 3+ weeks between periods, though, with light and \"stringy\"/ \"sticky\" blood.  Occasionally notes the blood present in her underwear/ panty liner, but most often notes it when she sits to void. Has very few days with no bleeding at all.  Only spotting present today.  Denies dysmenorrhea or pelvic pain.  Sexually active in a monogamous relationship; denies dyspareunia.  Using condoms for contraception.  Mahogany has a prescription for Progesterone to be taken for 10 days when she has heavy bleeding; she has not been taking this regularly (last taken ~2 months ago).      Mahogany states her last \"true\" menstrual period was 2017, but she has had intermittent bleeding, most days, since then. Has not taken anything to try to resolve this.    Mahogany denies fever, SOB, dizziness, weakness, or lightheadedness.  Denies symptoms of a UTI.  Mahogany has not had a recent pelvic ultrasound.  She had an endometrial biopsy done in  due to heavy menstrual bleeding; the result showed:  FINAL DIAGNOSIS:   Uterus, endometrial biopsy:         - Inactive to weakly secretory endometrium with stromal predecidualization (see comment)        - Foci of stromal breakdown.     COMMENT:   The findings are suggestive of exogenous hormone effect.  Clinical   correlation is " "recommended.  Since there are areas of glandular crowding   that are not sufficient for a diagnosis of simple hyperplasia in a   background of secretory changes, follow up is recommended.  Should the   bleeding persist, repeat biopsy during the proliferative phase is   recommended especially if the patient has risk factors to develop   endometrial hyperplasia.     Last pap smear: 2011 NIL  CBC done on 5/19/2017: Hgb 10.4, normal platelets  - Deqa was prescribed an iron supplement, but has not been taking it regularly    Current Outpatient Prescriptions   Medication     cholecalciferol (VITAMIN D3) 30539 UNITS capsule     ferrous gluconate (FERGON) 324 (38 FE) MG tablet     cholecalciferol (VITAMIN D) 1000 UNIT tablet     docusate sodium (COLACE) 100 MG capsule     No current facility-administered medications for this visit.      Past Medical History:   Diagnosis Date     Gestational diabetes      Past Surgical History:   Procedure Laterality Date     NO HISTORY OF SURGERY       OBJECTIVE:  BP 99/66 (BP Location: Left arm, Patient Position: Chair, Cuff Size: Adult Regular)  Pulse 96  Ht 1.626 m (5' 4.02\")  Wt 71 kg (156 lb 8 oz)  LMP 06/14/2017  BMI 26.85 kg/m2  General: pleasant female in no acute distress  Abdomen: soft, non-tender  Pelvic exam: infibulation; vagina and vulva without lesion; scant amount of brown-colored discharge present in vagina; cervix pale, friable with placement of speculum; without lesions or tenderness, uterus normal size, non-tender, adenxa normal in size without tenderness, pap smear done.    UPT: negative    ASSESSMENT:  Metrorrhagia  Menorrhagia (premenopausal)  Friable cervix    PLAN:  Endometrial biopsy recommended today; patient agreeable with this plan.  Labs ordered for evaluation of dysfunctional uterine bleeding: TSH, Prolactin, CBC, UPT (negative)  Pap smear (co-testing) completed today due to friability of cervix with placement of the speculum and last pap smear being done " "in  (cytology alone, no HPV testing done at that time).  Patient to schedule a transvaginal ultrasound in 2 -3 weeks for further evaluation of cause of bleeding  Recommended that Deqa start a hormonal method taken regularly to decrease her bleeding; patient declines at this time.  States she is not bothered by the amount of bleeding, but desires to understand the cause.  She is not interested in taking hormones at this time.  Patient agreed to revisit this discussion and the plan for care after her biopsy and ultrasound results are available.      Endometrial Biopsy    Menstrual History:  Menstrual History 2016   LAST MENSTRUAL PERIOD 2016     Time Out - \"Pause for the Cause\"  Just before the procedure begins, through verbal and active participation of team members, verify:                      Initials   Patient Name SLP   Patient  SLP   Procedure to be performed SLP                                                                                                                                   Indication: menometrorhagia    Pregnancy test:  Negative    Consent: Verbal consent obtained from patient. , Risks, benefits of treatment, and no treatment were discussed.  Patient's questions were elicited and answered. , Written consent signed and scanned into medical record. and Patient received and verbalized understanding of discharge instructions    Using a small  Wendy speculum, the cervix was visualized. The cervix was prepped with Betadine.  A single tooth tenaculum was applied to the anterior lip of the cervix at the 12 o'clock position. The endometrial pipelle was advanced through the cervix without difficulty and a sample collected. One additional pass was made.  The tenaculum was removed from the cervix and the tenaculum site made hemostatic with pressure.    EBL: minimal    Complications:  none  Pathology: EMB sample was sent to pathology.  Tolerance of " Procedure:  Patient did tolerate the procedure well. Patient was provided with ibuprofen after the procedure for uterine cramping     Patient was instructed to call if she experiences any heavy bleeding, severe cramping, or abnormal vaginal discharge.  May take ibuprofen 400-800 mg PO TID PRN or naproxen 500 mg PO BID for cramping.   Will notify patient of results.  See further plan for care above.    Annelise Guillen, HERSON, APRN, WHNP

## 2017-08-16 ENCOUNTER — TELEPHONE (OUTPATIENT)
Dept: OBGYN | Facility: CLINIC | Age: 47
End: 2017-08-16

## 2017-08-16 DIAGNOSIS — D64.9 ANEMIA, UNSPECIFIED TYPE: Primary | ICD-10-CM

## 2017-08-16 LAB
COPATH REPORT: NORMAL
COPATH REPORT: NORMAL
PAP: NORMAL

## 2017-08-16 RX ORDER — FERROUS SULFATE 325(65) MG
325 TABLET ORAL 2 TIMES DAILY
Qty: 60 TABLET | Refills: 1 | Status: SHIPPED | OUTPATIENT
Start: 2017-08-16 | End: 2020-02-04

## 2017-08-16 NOTE — TELEPHONE ENCOUNTER
Mahogany was notified of her endometrial biopsy result of Endometrial atypical hyperplasia.  Discussed the need for further evaluation, removal of this abnormal tissue, and the need to rule out endometrial cancer.  Mahogany expressed understanding.  She was instructed that she needs to return to clinic for a consult with one of the ObGyn physicians for discussion of the best plan for care.  Mahogany was scheduled with Dr. Gutierrez at 2:45pm on August 29th.  Mahogany expressed understanding of the importance of coming to this appointment.   All of her questions were answered prior to ending this call.  Annelise Guillen, DNP, APRN, WHNP

## 2017-08-16 NOTE — TELEPHONE ENCOUNTER
Reviewed Demarko's lab results with her.  Normal Prolactin and TSH. Hgb is 9.8.  Jose Aa was instructed to take ferrous sulfate 1 tablet per day for 1 week, followed by 2 tablets per day, as tolerated, thereafter.  Rx sent to patient's preferred pharmacy.  Patient was notified of potential side effects and the need to drink adequate fluids to prevent constipation.    Annelise Guillen, HERSON, APRN, WHNP

## 2017-08-17 LAB
FINAL DIAGNOSIS: NORMAL
HPV HR 12 DNA CVX QL NAA+PROBE: NEGATIVE
HPV16 DNA SPEC QL NAA+PROBE: NEGATIVE
HPV18 DNA SPEC QL NAA+PROBE: NEGATIVE
SPECIMEN DESCRIPTION: NORMAL

## 2017-08-18 ENCOUNTER — TELEPHONE (OUTPATIENT)
Dept: OBGYN | Facility: CLINIC | Age: 47
End: 2017-08-18

## 2017-08-18 DIAGNOSIS — N85.02 ENDOMETRIAL HYPERPLASIA WITH ATYPIA: Primary | ICD-10-CM

## 2017-08-18 NOTE — TELEPHONE ENCOUNTER
----- Message from Jovana Hudson LPN sent at 8/18/2017 10:53 AM CDT -----  Consulted with Dr. Gutierrez- she is to keep 9-1-2017 pelvic ultrasound appointment.  Please inform this to patient as well.  ----- Message -----     From: Jovana Hudson LPN     Sent: 8/18/2017   9:56 AM       To: [facility]    Made appointment for patient 9- gyn onc  At 11:45am  Left message- waiting for call back  ----- Message -----     From: Christine Gutierrez MD     Sent: 8/18/2017   9:22 AM       To: [facility]  [facility], #    Hello--    This patient got scheduled to see me for follow up for endometrial hyperplasia, but she actually needs to see gyn onc. Can you help facilitate this? I will order the consult. According to Annelise who saw her for her last visit, she is somewhat resistant to following up in general/in denial about her diagnosis- just FYI.     Thanks!    Christine

## 2017-08-21 ENCOUNTER — TELEPHONE (OUTPATIENT)
Dept: OBGYN | Facility: CLINIC | Age: 47
End: 2017-08-21

## 2017-08-21 NOTE — TELEPHONE ENCOUNTER
----- Message from Jovana Hudson LPN sent at 8/18/2017 10:53 AM CDT -----  Consulted with Dr. Gutierrez- she is to keep 9-1-2017 pelvic ultrasound appointment.  Please inform this to patient as well.  ----- Message -----     From: Jovana Hudson LPN     Sent: 8/18/2017   9:56 AM       To: [facility]    Made appointment for patient 9- gyn onc  At 11:45am  Left message- waiting for call back  ----- Message -----     From: Christine Gutierrez MD     Sent: 8/18/2017   9:22 AM       To: [facility]  [facility], #    Hello--    This patient got scheduled to see me for follow up for endometrial hyperplasia, but she actually needs to see gyn onc. Can you help facilitate this? I will order the consult. According to Annelise who saw her for her last visit, she is somewhat resistant to following up in general/in denial about her diagnosis- just FYI.     Thanks!    Christine

## 2017-08-21 NOTE — TELEPHONE ENCOUNTER
Left message for Demarko that Dr Gutierrez does want her to get a pelvic US in clinic before she sees provider at gyn/onc. Asked her to call back to schedule.

## 2017-08-23 NOTE — TELEPHONE ENCOUNTER
Tried to reach Deqa but received voicemail.  Left message to call back as ultrasound appointment is needed and to discuss appointment that was made for her at another clinic.    NOTE: nurse re-scheduled ultrasound appointment for 9/1 at 3:00pm but pt is unaware of this appointment.

## 2017-09-01 ENCOUNTER — OFFICE VISIT (OUTPATIENT)
Dept: OBGYN | Facility: CLINIC | Age: 47
End: 2017-09-01
Attending: OBSTETRICS & GYNECOLOGY
Payer: COMMERCIAL

## 2017-09-01 DIAGNOSIS — N92.4 EXCESSIVE BLEEDING IN PREMENOPAUSAL PERIOD: ICD-10-CM

## 2017-09-01 DIAGNOSIS — N92.1 METRORRHAGIA: ICD-10-CM

## 2017-09-01 PROCEDURE — 76856 US EXAM PELVIC COMPLETE: CPT | Mod: ZF

## 2017-09-01 NOTE — LETTER
9/1/2017     RE: Mahogany Whitman  [address] [address]     Dear Colleague,    Thank you for referring your patient, Mahogany Whitman, to the [facility] at [facility]. Please see a copy of my visit note below.    46 year old female with LMP 8/16/17 presents for gynecologic ultrasound indicated by metrorrhagia.  This study was done transvaginally.    Uterine findings:   Presence: Visible Size: Normal 5.7 x 6.5 x 5.2cm , bulky anterior uterus  Endometrium = 4.4 mm.   Cx length = 35.7 mm.      Flexion:  Anteverted Position: Midline Margins: Smooth Shape: Normal   Contour: Regular Texture: Heterogeneous Cavity: Normal Masses: Normal    Pelvic findings:    Right Adnexa: Normal   Left Adnexa: Normal   Bladder:  Normal         Cul - de - sac fluid: None    Ovarian follicles:   Right ovary:  2.9 x 2.5 x 1.0cm.   0 follicles   Left ovary:  Not visualized     Comments:  Possible adenomyosis.  PETER Calix

## 2017-09-01 NOTE — MR AVS SNAPSHOT
After Visit Summary   2017    Mhaogany Whitman    MRN: [id number]           Patient Information     Date Of Birth          1970        Visit Information        Provider Department      2017 3:00 PM [facility]TRAS[facility]        Today's Diagnoses     Metrorrhagia        Excessive bleeding in premenopausal period           Follow-ups after your visit        Your next 10 appointments already scheduled     Sep 18, 2017 12:00 PM CDT   (Arrive by 11:45 AM)   New Patient Visit with Lyle Rodriguez MD   [facility] ([facility])    [address] [place] [address] [phone]              Who to contact     Please call your clinic at [phone] to:    Ask questions about your health    Make or cancel appointments    Discuss your medicines    Learn about your test results    Speak to your doctor   If you have compliments or concerns about an experience at your clinic, or if you wish to file a complaint, please contact [facility] Patient Relations at [phone] or email us at [email or web address]         Additional Information About Your Visit        MyChart Information     [employer] People[employer] is an electronic gateway that provides easy, online access to your medical records. With M:Metrics, you can request a clinic appointment, read your test results, renew a prescription or communicate with your care team.     To sign up for [employer] People[employer] visit the website at [email or web address]   You will be asked to enter the access code listed below, as well as some personal information. Please follow the directions to create your username and password.     Your access code is: [email or web address]  Expires: 2017  6:30 AM     Your access code will  in 90 days. If you need help or a new code, please contact your [facility] or call  [phone] for assistance.        Care EveryWhere ID     This is your Care EveryWhere ID. This could be used by other organizations to access your [facility] medical records  [id number]        Your Vitals Were     Last Period                   06/14/2017            Blood Pressure from Last 3 Encounters:   08/11/17 99/66   06/14/17 102/66   05/25/17 96/67    Weight from Last 3 Encounters:   08/11/17 71 kg (156 lb 8 oz)   06/14/17 73.5 kg (162 lb)   05/25/17 74.8 kg (165 lb)              We Performed the Following     US GYN Pelvic, Complete (In Clinic) [[id number]]        Primary Care Provider Office Phone # Fax #    Corinna Neely [phone]       [address] [address]        Equal Access to Services     [facility]: Hadii aad ku hadasho Soomaali, waaxda luqadaha, qaybta kaalmada adeegyada, radhames garrett hayjordana bautista . So [facility] [phone].    ATENCIÓN: Si habla español, tiene a iverson disposición servicios gratuitos de asistencia lingüística. Mima al [phone].    We comply with applicable federal civil rights laws and [place] laws. We do not discriminate on the basis of race, color, national origin, age, disability sex, sexual orientation or gender identity.            Thank you!     Thank you for choosing [facility]  for your care. Our goal is always to provide you with excellent care. Hearing back from our patients is one way we can continue to improve our services. Please take a few minutes to complete the written survey that you may receive in the mail after your visit with us. Thank you!             Your Updated Medication List - Protect others around you: Learn how to safely use, store and throw away your medicines at www.disposemymeds.org.          This list is accurate as of: 9/1/17  3:39 PM.  Always use your most recent med list.                   Brand Name Dispense Instructions for use Diagnosis    *  cholecalciferol 1000 UNIT tablet    vitamin D    100 tablet    Take 1 tablet (1,000 Units) by mouth daily    Vitamin D deficiency       * cholecalciferol 56175 UNITS capsule    VITAMIN D3    8 capsule    Take 1 capsule (50,000 Units) by mouth once a week    Vitamin D deficiency       docusate sodium 100 MG capsule    COLACE    60 capsule    Take 1-2 capsules daily as needed for constipation    Chronic constipation       ferrous gluconate 324 (38 FE) MG tablet    FERGON    90 tablet    Take 1 tablet (324 mg) by mouth 2 times daily    Abnormal uterine bleeding       ferrous sulfate 325 (65 FE) MG tablet    IRON    60 tablet    Take 1 tablet (325 mg) by mouth 2 times daily    Anemia, unspecified type       * Notice:  This list has 2 medication(s) that are the same as other medications prescribed for you. Read the directions carefully, and ask your doctor or other care provider to review them with you.

## 2017-09-01 NOTE — PROGRESS NOTES
46 year old female with LMP 8/16/17 presents for gynecologic ultrasound indicated by metrorrhagia.  This study was done transvaginally.    Uterine findings:   Presence: Visible Size: Normal 5.7 x 6.5 x 5.2cm , bulky anterior uterus  Endometrium = 4.4 mm.   Cx length = 35.7 mm.      Flexion:  Anteverted Position: Midline Margins: Smooth Shape: Normal   Contour: Regular Texture: Heterogeneous Cavity: Normal Masses: Normal    Pelvic findings:    Right Adnexa: Normal   Left Adnexa: Normal   Bladder:  Normal         Cul - de - sac fluid: None    Ovarian follicles:   Right ovary:  2.9 x 2.5 x 1.0cm.     0 follicles        Left ovary:  Not visualized     Comments:  Possible adenomyosis.    PETER Calix

## 2017-09-18 ENCOUNTER — ONCOLOGY VISIT (OUTPATIENT)
Dept: ONCOLOGY | Facility: CLINIC | Age: 47
End: 2017-09-18
Attending: OBSTETRICS & GYNECOLOGY
Payer: COMMERCIAL

## 2017-09-18 VITALS
SYSTOLIC BLOOD PRESSURE: 105 MMHG | OXYGEN SATURATION: 100 % | HEIGHT: 64 IN | WEIGHT: 156 LBS | BODY MASS INDEX: 26.63 KG/M2 | HEART RATE: 81 BPM | TEMPERATURE: 97.9 F | RESPIRATION RATE: 16 BRPM | DIASTOLIC BLOOD PRESSURE: 72 MMHG

## 2017-09-18 DIAGNOSIS — E55.9 VITAMIN D DEFICIENCY: ICD-10-CM

## 2017-09-18 DIAGNOSIS — R30.0 DYSURIA: Primary | ICD-10-CM

## 2017-09-18 DIAGNOSIS — R30.0 DYSURIA: ICD-10-CM

## 2017-09-18 DIAGNOSIS — N85.02 ENDOMETRIAL HYPERPLASIA WITH ATYPIA: Primary | ICD-10-CM

## 2017-09-18 LAB
ALBUMIN UR-MCNC: NEGATIVE MG/DL
APPEARANCE UR: ABNORMAL
BACTERIA #/AREA URNS HPF: ABNORMAL /HPF
BILIRUB UR QL STRIP: NEGATIVE
COLOR UR AUTO: YELLOW
GLUCOSE UR STRIP-MCNC: NEGATIVE MG/DL
HGB UR QL STRIP: NEGATIVE
KETONES UR STRIP-MCNC: NEGATIVE MG/DL
LEUKOCYTE ESTERASE UR QL STRIP: ABNORMAL
MUCOUS THREADS #/AREA URNS LPF: PRESENT /LPF
NITRATE UR QL: NEGATIVE
PH UR STRIP: 5 PH (ref 5–7)
RBC #/AREA URNS AUTO: 3 /HPF (ref 0–2)
SOURCE: ABNORMAL
SP GR UR STRIP: 1 (ref 1–1.03)
SQUAMOUS #/AREA URNS AUTO: 3 /HPF (ref 0–1)
UROBILINOGEN UR STRIP-MCNC: 0 MG/DL (ref 0–2)
WBC #/AREA URNS AUTO: 3 /HPF (ref 0–2)

## 2017-09-18 PROCEDURE — 87086 URINE CULTURE/COLONY COUNT: CPT | Performed by: OBSTETRICS & GYNECOLOGY

## 2017-09-18 PROCEDURE — 81001 URINALYSIS AUTO W/SCOPE: CPT | Performed by: OBSTETRICS & GYNECOLOGY

## 2017-09-18 PROCEDURE — 99212 OFFICE O/P EST SF 10 MIN: CPT | Mod: ZF

## 2017-09-18 ASSESSMENT — PAIN SCALES - GENERAL: PAINLEVEL: NO PAIN (0)

## 2017-09-18 NOTE — LETTER
9/18/2017       RE: Mahogany Whitman  [address] [facility] [address]     Dear Colleague,    Thank you for referring your patient, Mahogany Whitman, to the [facility]. Please see a copy of my visit note below.    Patient is not able to have a male provider, we have her follow up with a female gyn oncologist.    Lyle Rodriguez MD, MS    Department of Obstetrics and Gynecology   Division of Gynecologic Oncology   [facility]  Phone: [phone]      Again, thank you for allowing me to participate in the care of your patient.      Sincerely,    Lyle Rodriguez MD

## 2017-09-18 NOTE — PROGRESS NOTES
Patient is not able to have a male provider, we have her follow up with a female gyn oncologist.    Lyle Rodriguez MD, MS    Department of Obstetrics and Gynecology   Division of Gynecologic Oncology   [facility]  Phone: [phone]

## 2017-09-18 NOTE — MR AVS SNAPSHOT
"              After Visit Summary   9/18/2017    Mahogany Whitman    MRN: [id number]           Patient Information     Date Of Birth          1970        Visit Information        Provider Department      9/18/2017 12:00 PM Lyle Rodriguez MD [facility]        Today's Diagnoses     Endometrial hyperplasia with atypia    -  1       Follow-ups after your visit        Future tests that were ordered for you today     Open Future Orders        Priority Expected Expires Ordered    UA with Microscopic reflex to Culture Routine  9/18/2018 9/18/2017            Who to contact     If you have questions or need follow up information about today's clinic visit or your schedule please contact [facility] directly at [phone].  Normal or non-critical lab and imaging results will be communicated to you by [employer], letter or phone within 4 business days after the clinic has received the results. If you do not hear from us within 7 days, please contact the clinic through [employer] or phone. If you have a critical or abnormal lab result, we will notify you by phone as soon as possible.  Submit refill requests through [employer] or call your pharmacy and they will forward the refill request to us. Please allow 3 business days for your refill to be completed.          Additional Information About Your Visit        MyChart Information     [employer] lets you send messages to your doctor, view your test results, renew your prescriptions, schedule appointments and more. To sign up, go to [email or web address] . Click on \"Log in\" on the left side of the screen, which will take you to the Welcome page. Then click on \"Sign up Now\" on the right side of the page.     You will be asked to enter the access code listed below, as well as some personal information. Please follow the directions to create your username and password.     Your access code is: [email or web address]  Expires: 11/22/2017  6:30 AM   " "  Your access code will  in 90 days. If you need help or a new code, please call your [facility] clinic or [phone].        Care EveryWhere ID     This is your Care EveryWhere ID. This could be used by other organizations to access your [facility] medical records  [id number]        Your Vitals Were     Pulse Temperature Respirations Height Pulse Oximetry BMI (Body Mass Index)    81 97.9  F (36.6  C) (Oral) 16 1.626 m (5' 4.02\") 100% 26.76 kg/m2       Blood Pressure from Last 3 Encounters:   17 105/72   17 99/66   17 102/66    Weight from Last 3 Encounters:   17 70.8 kg (156 lb)   17 71 kg (156 lb 8 oz)   17 73.5 kg (162 lb)              Today, you had the following     No orders found for display         Where to get your medicines      These medications were sent to [facility] [place], [place] - [address]   [address] [facility] [address]     Phone:  [phone]     cholecalciferol 35093 UNITS capsule          Primary Care Provider Office Phone # Fax #    Corinna Neely [phone]       [address] [address]        Equal Access to Services     DIEGO ARGUETA : Kisha Contreras, waaxda sheela, qaybta bryanalradhames puri. So [facility] [phone].    ATENCIÓN: Si habla español, tiene a iverson disposición servicios gratuitos de asistencia lingüística. Llame al [phone].    We comply with applicable federal civil rights laws and [place] laws. We do not discriminate on the basis of race, color, national origin, age, disability sex, sexual orientation or gender identity.            Thank you!     Thank you for choosing [facility]  for your care. Our goal is always to provide you with excellent care. Hearing back from our patients is one way we can continue to improve our services. Please take a few minutes to complete the " written survey that you may receive in the mail after your visit with us. Thank you!             Your Updated Medication List - Protect others around you: Learn how to safely use, store and throw away your medicines at www.disposemymeds.org.          This list is accurate as of: 9/18/17  1:29 PM.  Always use your most recent med list.                   Brand Name Dispense Instructions for use Diagnosis    * cholecalciferol 1000 UNIT tablet    vitamin D    100 tablet    Take 1 tablet (1,000 Units) by mouth daily    Vitamin D deficiency       * cholecalciferol 40820 UNITS capsule    VITAMIN D3    8 capsule    Take 1 capsule (50,000 Units) by mouth once a week    Vitamin D deficiency       docusate sodium 100 MG capsule    COLACE    60 capsule    Take 1-2 capsules daily as needed for constipation    Chronic constipation       ferrous gluconate 324 (38 FE) MG tablet    FERGON    90 tablet    Take 1 tablet (324 mg) by mouth 2 times daily    Abnormal uterine bleeding       ferrous sulfate 325 (65 FE) MG tablet    IRON    60 tablet    Take 1 tablet (325 mg) by mouth 2 times daily    Anemia, unspecified type       * Notice:  This list has 2 medication(s) that are the same as other medications prescribed for you. Read the directions carefully, and ask your doctor or other care provider to review them with you.

## 2017-09-19 LAB
BACTERIA SPEC CULT: NORMAL
Lab: NORMAL
SPECIMEN SOURCE: NORMAL

## 2017-09-21 DIAGNOSIS — R30.0 DYSURIA: Primary | ICD-10-CM

## 2017-09-21 RX ORDER — CIPROFLOXACIN 500 MG/1
500 TABLET, FILM COATED ORAL 2 TIMES DAILY
Qty: 6 TABLET | Refills: 0 | Status: SHIPPED | OUTPATIENT
Start: 2017-09-21 | End: 2020-02-04

## 2017-10-05 ENCOUNTER — ONCOLOGY VISIT (OUTPATIENT)
Dept: ONCOLOGY | Facility: CLINIC | Age: 47
End: 2017-10-05
Attending: OBSTETRICS & GYNECOLOGY
Payer: COMMERCIAL

## 2017-10-05 VITALS
HEIGHT: 64 IN | DIASTOLIC BLOOD PRESSURE: 77 MMHG | RESPIRATION RATE: 16 BRPM | TEMPERATURE: 96.4 F | HEART RATE: 85 BPM | OXYGEN SATURATION: 100 % | WEIGHT: 155.3 LBS | SYSTOLIC BLOOD PRESSURE: 118 MMHG | BODY MASS INDEX: 26.51 KG/M2

## 2017-10-05 DIAGNOSIS — N93.9 ABNORMAL VAGINAL BLEEDING: ICD-10-CM

## 2017-10-05 DIAGNOSIS — N93.9 ABNORMAL VAGINAL BLEEDING: Primary | ICD-10-CM

## 2017-10-05 LAB — HGB BLD-MCNC: 10.6 G/DL (ref 11.7–15.7)

## 2017-10-05 PROCEDURE — 99212 OFFICE O/P EST SF 10 MIN: CPT | Mod: ZF

## 2017-10-05 PROCEDURE — 85018 HEMOGLOBIN: CPT | Performed by: OBSTETRICS & GYNECOLOGY

## 2017-10-05 PROCEDURE — 99204 OFFICE O/P NEW MOD 45 MIN: CPT | Mod: ZP | Performed by: OBSTETRICS & GYNECOLOGY

## 2017-10-05 PROCEDURE — 36415 COLL VENOUS BLD VENIPUNCTURE: CPT | Performed by: OBSTETRICS & GYNECOLOGY

## 2017-10-05 ASSESSMENT — PAIN SCALES - GENERAL: PAINLEVEL: NO PAIN (0)

## 2017-10-05 NOTE — LETTER
"10/5/2017     RE: Mahogany Whitman  [address] [facility] [address]     Dear Colleague,    Thank you for referring your patient, Mahogany Whitman, to the [facility]. Please see a copy of my visit note below.                            Consult Notes on Referred Patient    Date: 10/5/2017       Dr. Christine Gutierrez MD  [address] [place] [address]       RE: Mahogany Whitman  : 1970  SAVANNAH: 10/5/2017     Dear Dr. Christine Gutierrez:    I had the pleasure of seeing your patient Mahogany Whitman here at the Gynecologic Cancer Clinic at the [facility] on 10/5/2017.  As you know she is a very pleasant 46 year old woman with a recent diagnosis of Complex hyperplasia with atypia.  Given these findings she was subsequently sent to the Gynecologic Cancer Clinic for new patient consultation.     Brief History:  Mahogany Whitman is a 46 yr old female, , who presented originall for evaluation of abnormal uterine bleeding.    Mahogany states that her abnormal bleeding started 1 year ago.  She notes that she has menses are regular intervals with heavy bleeding that lasts 3-4 days, changing her tampon 3-4 times per day.  She has intermenstrual bleeding for 3+ weeks between periods, though, with light and \"stringy\"/ \"sticky\" blood. Mahogany states her LMP was 2017, but she has had intermittent bleeding, most days, since then.     Today she denies vaginal bleeding. She denies any bowel or urinary issues. No abdominal pain or distention. No pelvic pain or back pain. No fever or chills. No nausea or vomiting. No chest pain or SOB. She is eating and drinking well.     2017:   FINAL DIAGNOSIS:   Uterus, endometrial biopsy:         - Inactive to weakly secretory endometrium with stromal predecidualization (see comment)        - Foci of stromal breakdown.     COMMENT:   The findings are suggestive of exogenous hormone effect.  Clinical "   correlation is recommended.  Since there are areas of glandular crowding   that are not sufficient for a diagnosis of simple hyperplasia in a   background of secretory changes, follow up is recommended.  Should the   bleeding persist, repeat biopsy during the proliferative phase is   recommended especially if the patient has risk factors to develop   endometrial hyperplasia.    9/1/17: Pelvic US   Uterine findings:                        Presence: Visible Size: Normal 5.7 x 6.5 x 5.2cm , bulky anterior uterus  Endometrium = 4.4 mm.                        Cx length = 35.7 mm.                            Flexion:  Anteverted              Position: Midline                    Margins: Smooth                   Shape: Normal                        Contour: Regular                  Texture: Heterogeneous                   Cavity: Normal                      Masses: Normal     Pelvic findings:                         Right Adnexa: Normal                        Left Adnexa: Normal                        Bladder:  Normal                                                                                                             Cul - de - sac fluid: None     Ovarian follicles:                        Right ovary:  2.9 x 2.5 x 1.0cm.                        0 follicles                        Left ovary:  Not visualized       Last pap smear: 2011 NIL  CBC done on 5/19/2017: Hgb 10.4, normal platelets  - Deqa was prescribed an iron supplement, but has not been taking it regularly    Review of Systems:  Systemic           no weight changes; no fever; no chills; no night sweats; no appetite changes  Skin           no rashes, or lesions  Eye           no irritation; no changes in vision  Harinder-Laryngeal           no dysphagia; no hoarseness   Pulmonary    no cough; no shortness of breath  Cardiovascular    no chest pain; no palpitations  Gastrointestinal    no diarrhea; no constipation; no abdominal pain; no changes in bowel  habits;  no blood in stool  Genitourinary   no urinary frequency; no urinary urgency; no dysuria; no pain; no abnormal vaginal discharge; no abnormal vaginal bleeding  Breast   no breast discharge; no breast changes; no breast pain  Musculoskeletal    no myalgias; no arthralgias; no back pain  Psychiatric           no depressed mood; no anxiety    Hematologic           no tender lymph nodes; no noticeable swellings or lumps   Endocrine    no hot flashes; no heat/cold intolerance         Neurological   no tremor; no numbness and tingling; no headaches; no difficulty  sleeping    I have reviewed and addressed the patient's review of symptoms for today's visit.     Past Medical History:    Past Medical History:   Diagnosis Date     Gestational diabetes        Past Surgical History:    Past Surgical History:   Procedure Laterality Date     NO HISTORY OF SURGERY         Health Maintenance:  Health Maintenance Due   Topic Date Due     LIPID SCREEN Q5 YR FEMALE (SYSTEM ASSIGNED)  10/21/2016     INFLUENZA VACCINE (SYSTEM ASSIGNED)  09/01/2017       Last Pap Smear:   Lab Results   Component Value Date    PAP NIL 08/11/2017   She has not had a history of abnormal Pap smears.    Last Mammogram: none    Last Colonoscopy: None                     Last Thyroid Screening:        TSH   Date Value Ref Range Status   08/11/2017 0.78 0.40 - 4.00 mU/L Final       Last Cholest Screening:        LDL Cholesterol Direct (mg/dL)   Date Value   03/17/2011 70       Current Medications:     has a current medication list which includes the following prescription(s): ciprofloxacin, cholecalciferol, ferrous sulfate, docusate sodium, ferrous gluconate, and cholecalciferol.     Allergies:      No Known Allergies      Social History:     Social History   Substance Use Topics     Smoking status: Never Smoker     Smokeless tobacco: Never Used     Alcohol use No       History   Drug Use No       Family History:     Family History   Problem Relation Age of Onset  "    Asthma No family hx of      C.A.D. No family hx of      DIABETES No family hx of      Hypertension No family hx of      Breast Cancer No family hx of      Cancer - colorectal No family hx of        Physical Exam:     /77  Pulse 85  Temp 96.4  F (35.8  C) (Oral)  Resp 16  Ht 1.626 m (5' 4.02\")  Wt 70.4 kg (155 lb 4.8 oz)  SpO2 100%  BMI 26.64 kg/m2  Body mass index is 26.64 kg/(m^2).    General Appearance: healthy and alert, no distress     Psychiatric: appropriate mood and affect                               :   Declined exam at this time.  Assessment:    Mahogany Whitman is a 46 year old woman with a new diagnosis of Complex hyperplasia with atypia.       Plan:    1.)   Complex hyperplasia with atypia: treatment options were discussed with patient and son in detail. Options included TLH-BSO, treatment with oral progesterone or Mirena IUD. Pt declined all intervention at this time. Would like to discuss options with family and RTC in 2 weeks to make decision regarding treatment. Patient also requested HGB check today. Risk for cancer explained to patient and son in detail. Reviewed signs and symptoms for when she should contact the clinic or seek additional care. Patient to contact the clinic with any questions or concerns in the interim.     2.) Genetic risk factors were assessed and the patient does not meet the qualifications for a referral.      3.) Labs and/or tests ordered include:  CBC.     4.) Health maintenance issues addressed today include none.      Thank you for allowing us to participate in the care of your patient.       Sincerely,    Dominique Gomez MD    Department of Ob/Gyn and Women's Health  Division of Gynecologic Oncology  [facility]  [phone]     Of a 45 minute appointment, more than 50% was spent in counseling the patient regarding risks of retaining uterus including 30-40% chance of finding endometrial cancer at the " time of the surgery. Patient elicited understanding of the above as did her son.      CC  Patient Care Team:  Corinna Neely MD as PCP - General (Family Practice)  Rebecca Truong MD as Resident (Student in organized health care education/training program)  Vinod Mock MD as MD (Family Practice)  Meka Gomes MD as Referring Physician ([facility])  Padmini Garcia MD as MD (OB/Gyn)  SONNY FLORIAN Morgan Faulkner, am serving as a scribe to document services personally performed by Dominique Gomez MD, based upon my observations and the provider's statements to me. All documentation has been reviewed by the aforementioned doctor prior to being entered into the official medical record.     I have reviewed the above note and agree with the scribe's notation as written.    Again, thank you for allowing me to participate in the care of your patient.      Sincerely,    Dominique Gomez MD

## 2017-10-05 NOTE — MR AVS SNAPSHOT
"              After Visit Summary   10/5/2017    Mahogany Whitman    MRN: [id number]           Patient Information     Date Of Birth          1970        Visit Information        Provider Department      10/5/2017 2:40 PM Dominique Gomez MD [facility]        Today's Diagnoses     Abnormal vaginal bleeding    -  1       Follow-ups after your visit        Your next 10 appointments already scheduled     Nov 09, 2017  2:40 PM CST   (Arrive by 2:25 PM)   Return Visit with Dominique Gomez MD   [facility] ([facility])    [address] [place] [address] [phone]              Who to contact     If you have questions or need follow up information about today's clinic visit or your schedule please contact [facility] directly at [phone].  Normal or non-critical lab and imaging results will be communicated to you by MyChart, letter or phone within 4 business days after the clinic has received the results. If you do not hear from us within 7 days, please contact the clinic through MyChart or phone. If you have a critical or abnormal lab result, we will notify you by phone as soon as possible.  Submit refill requests through [employer] or call your pharmacy and they will forward the refill request to us. Please allow 3 business days for your refill to be completed.          Additional Information About Your Visit        MyChart Information     [employer] lets you send messages to your doctor, view your test results, renew your prescriptions, schedule appointments and more. To sign up, go to [email or web address] . Click on \"Log in\" on the left side of the screen, which will take you to the Welcome page. Then click on \"Sign up Now\" on the right side of the page.     You will be asked to enter the access code listed below, as well as some personal information. Please follow the directions to " "create your username and password.     Your access code is: [email or web address]  Expires: 2017  6:30 AM     Your access code will  in 90 days. If you need help or a new code, please call your [facility] or [phone].        Care EveryWhere ID     This is your Care EveryWhere ID. This could be used by other organizations to access your [place] medical records  [id number]        Your Vitals Were     Pulse Temperature Respirations Height Pulse Oximetry BMI (Body Mass Index)    85 96.4  F (35.8  C) (Oral) 16 1.626 m (5' 4.02\") 100% 26.64 kg/m2       Blood Pressure from Last 3 Encounters:   10/05/17 118/77   17 105/72   17 99/66    Weight from Last 3 Encounters:   10/05/17 70.4 kg (155 lb 4.8 oz)   17 70.8 kg (156 lb)   17 71 kg (156 lb 8 oz)               Primary Care Provider Office Phone # Fax #    Corinna Neely [phone]       [address] [address]        Equal Access to Services     DIEGO ARGUETA : Hadii reena musa hadasho Soanahyali, waaxda luqadaha, qaybta kaalmada adeegyada, radhames pretty. So [facility] [phone].    ATENCIÓN: Si habla español, tiene a iverson disposición servicios gratuitos de asistencia lingüística. alvarado al [phone].    We comply with applicable federal civil rights laws and [place] laws. We do not discriminate on the basis of race, color, national origin, age, disability, sex, sexual orientation, or gender identity.            Thank you!     Thank you for choosing [facility]  for your care. Our goal is always to provide you with excellent care. Hearing back from our patients is one way we can continue to improve our services. Please take a few minutes to complete the written survey that you may receive in the mail after your visit with us. Thank you!             Your Updated Medication List - Protect others around you: Learn how to safely use, store and throw " away your medicines at www.disposemymeds.org.          This list is accurate as of: 10/5/17 11:59 PM.  Always use your most recent med list.                   Brand Name Dispense Instructions for use Diagnosis    * cholecalciferol 1000 UNIT tablet    vitamin D3    100 tablet    Take 1 tablet (1,000 Units) by mouth daily    Vitamin D deficiency       * cholecalciferol 57903 UNITS capsule    VITAMIN D3    8 capsule    Take 1 capsule (50,000 Units) by mouth once a week    Vitamin D deficiency       ciprofloxacin 500 MG tablet    CIPRO    6 tablet    Take 1 tablet (500 mg) by mouth 2 times daily    Dysuria       docusate sodium 100 MG capsule    COLACE    60 capsule    Take 1-2 capsules daily as needed for constipation    Chronic constipation       ferrous gluconate 324 (38 FE) MG tablet    FERGON    90 tablet    Take 1 tablet (324 mg) by mouth 2 times daily    Abnormal uterine bleeding       ferrous sulfate 325 (65 FE) MG tablet    IRON    60 tablet    Take 1 tablet (325 mg) by mouth 2 times daily    Anemia, unspecified type       * Notice:  This list has 2 medication(s) that are the same as other medications prescribed for you. Read the directions carefully, and ask your doctor or other care provider to review them with you.

## 2017-10-05 NOTE — PROGRESS NOTES
"                        Consult Notes on Referred Patient    Date: 10/5/2017       Dr. Christine Gutierrez MD  [address] [place] [address]       RE: Mahogany Whitman  : 1970  SAVANNAH: 10/5/2017     Dear Dr. Christine Gutierrez:    I had the pleasure of seeing your patient Mahogany Whitman here at the Gynecologic Cancer Clinic at the [facility] on 10/5/2017.  As you know she is a very pleasant 46 year old woman with a recent diagnosis of Complex hyperplasia with atypia.  Given these findings she was subsequently sent to the Gynecologic Cancer Clinic for new patient consultation.     Brief History:  Mahogany Whitman is a 46 yr old female, , who presented originall for evaluation of abnormal uterine bleeding.    Mahogany states that her abnormal bleeding started 1 year ago.  She notes that she has menses are regular intervals with heavy bleeding that lasts 3-4 days, changing her tampon 3-4 times per day.  She has intermenstrual bleeding for 3+ weeks between periods, though, with light and \"stringy\"/ \"sticky\" blood. Mahogany states her LMP was 2017, but she has had intermittent bleeding, most days, since then.     Today she denies vaginal bleeding. She denies any bowel or urinary issues. No abdominal pain or distention. No pelvic pain or back pain. No fever or chills. No nausea or vomiting. No chest pain or SOB. She is eating and drinking well.     2017:   FINAL DIAGNOSIS:   Uterus, endometrial biopsy:         - Inactive to weakly secretory endometrium with stromal predecidualization (see comment)        - Foci of stromal breakdown.     COMMENT:   The findings are suggestive of exogenous hormone effect.  Clinical   correlation is recommended.  Since there are areas of glandular crowding   that are not sufficient for a diagnosis of simple hyperplasia in a   background of secretory changes, follow up is recommended.  Should the   bleeding persist, repeat biopsy during the " proliferative phase is   recommended especially if the patient has risk factors to develop   endometrial hyperplasia.    9/1/17: Pelvic US   Uterine findings:                        Presence: Visible Size: Normal 5.7 x 6.5 x 5.2cm , bulky anterior uterus  Endometrium = 4.4 mm.                        Cx length = 35.7 mm.                            Flexion:  Anteverted              Position: Midline                    Margins: Smooth                   Shape: Normal                        Contour: Regular                  Texture: Heterogeneous                   Cavity: Normal                      Masses: Normal     Pelvic findings:                         Right Adnexa: Normal                        Left Adnexa: Normal                        Bladder:  Normal                                                                                                             Cul - de - sac fluid: None     Ovarian follicles:                        Right ovary:  2.9 x 2.5 x 1.0cm.                        0 follicles                        Left ovary:  Not visualized       Last pap smear: 2011 NIL  CBC done on 5/19/2017: Hgb 10.4, normal platelets  - Deqa was prescribed an iron supplement, but has not been taking it regularly    Review of Systems:  Systemic           no weight changes; no fever; no chills; no night sweats; no appetite changes  Skin           no rashes, or lesions  Eye           no irritation; no changes in vision  Harinder-Laryngeal           no dysphagia; no hoarseness   Pulmonary    no cough; no shortness of breath  Cardiovascular    no chest pain; no palpitations  Gastrointestinal    no diarrhea; no constipation; no abdominal pain; no changes in bowel  habits; no blood in stool  Genitourinary   no urinary frequency; no urinary urgency; no dysuria; no pain; no abnormal vaginal discharge; no abnormal vaginal bleeding  Breast   no breast discharge; no breast changes; no breast pain  Musculoskeletal    no myalgias; no  arthralgias; no back pain  Psychiatric           no depressed mood; no anxiety    Hematologic           no tender lymph nodes; no noticeable swellings or lumps   Endocrine    no hot flashes; no heat/cold intolerance         Neurological   no tremor; no numbness and tingling; no headaches; no difficulty  sleeping    I have reviewed and addressed the patient's review of symptoms for today's visit.     Past Medical History:    Past Medical History:   Diagnosis Date     Gestational diabetes        Past Surgical History:    Past Surgical History:   Procedure Laterality Date     NO HISTORY OF SURGERY         Health Maintenance:  Health Maintenance Due   Topic Date Due     LIPID SCREEN Q5 YR FEMALE (SYSTEM ASSIGNED)  10/21/2016     INFLUENZA VACCINE (SYSTEM ASSIGNED)  09/01/2017       Last Pap Smear:   Lab Results   Component Value Date    PAP NIL 08/11/2017   She has not had a history of abnormal Pap smears.    Last Mammogram: none    Last Colonoscopy: None                     Last Thyroid Screening:        TSH   Date Value Ref Range Status   08/11/2017 0.78 0.40 - 4.00 mU/L Final       Last Cholest Screening:        LDL Cholesterol Direct (mg/dL)   Date Value   03/17/2011 70       Current Medications:     has a current medication list which includes the following prescription(s): ciprofloxacin, cholecalciferol, ferrous sulfate, docusate sodium, ferrous gluconate, and cholecalciferol.     Allergies:      No Known Allergies      Social History:     Social History   Substance Use Topics     Smoking status: Never Smoker     Smokeless tobacco: Never Used     Alcohol use No       History   Drug Use No       Family History:     Family History   Problem Relation Age of Onset     Asthma No family hx of      C.A.D. No family hx of      DIABETES No family hx of      Hypertension No family hx of      Breast Cancer No family hx of      Cancer - colorectal No family hx of        Physical Exam:     /77  Pulse 85  Temp 96.4  F  "(35.8  C) (Oral)  Resp 16  Ht 1.626 m (5' 4.02\")  Wt 70.4 kg (155 lb 4.8 oz)  SpO2 100%  BMI 26.64 kg/m2  Body mass index is 26.64 kg/(m^2).    General Appearance: healthy and alert, no distress     Psychiatric: appropriate mood and affect                               :   Declined exam at this time.  Assessment:    Mahogany Whitman is a 46 year old woman with a new diagnosis of Complex hyperplasia with atypia.       Plan:    1.)   Complex hyperplasia with atypia: treatment options were discussed with patient and son in detail. Options included TLH-BSO, treatment with oral progesterone or Mirena IUD. Pt declined all intervention at this time. Would like to discuss options with family and RTC in 2 weeks to make decision regarding treatment. Patient also requested HGB check today. Risk for cancer explained to patient and son in detail. Reviewed signs and symptoms for when she should contact the clinic or seek additional care. Patient to contact the clinic with any questions or concerns in the interim.     2.) Genetic risk factors were assessed and the patient does not meet the qualifications for a referral.      3.) Labs and/or tests ordered include:  CBC.     4.) Health maintenance issues addressed today include none.      Thank you for allowing us to participate in the care of your patient.       Sincerely,    Dominique Gomez MD    Department of Ob/Gyn and Women's Health  Division of Gynecologic Oncology  [facility]  [phone]     Of a 45 minute appointment, more than 50% was spent in counseling the patient regarding risks of retaining uterus including 30-40% chance of finding endometrial cancer at the time of the surgery. Patient elicited understanding of the above as did her son.      CC  Patient Care Team:  Corinna Neely MD as PCP - General (Family Practice)  Rebecca Truong MD as Resident (Student in organized health care " education/training program)  Vinod Mock MD as MD (Family Practice)  Meka Gomes MD as Referring Physician (Family Practice)  Padmini Garcia MD as MD (OB/Gyn)  SONNY FLORIAN, Federico Ware, am serving as a scribe to document services personally performed by Dominique Gomez MD, based upon my observations and the provider's statements to me. All documentation has been reviewed by the aforementioned doctor prior to being entered into the official medical record.     I have reviewed the above note and agree with the scribe's notation as written.

## 2017-10-05 NOTE — NURSING NOTE
"Oncology Rooming Note    October 5, 2017 1:58 PM   Mahogany Whitman is a 46 year old female who presents for:    Chief Complaint   Patient presents with     Oncology Clinic Visit     Endometrial Hyperplasia with Atypia Matt     Initial Vitals: /77  Pulse 85  Temp 96.4  F (35.8  C) (Oral)  Resp 16  Ht 1.626 m (5' 4.02\")  Wt 70.4 kg (155 lb 4.8 oz)  SpO2 100%  BMI 26.64 kg/m2 Estimated body mass index is 26.64 kg/(m^2) as calculated from the following:    Height as of this encounter: 1.626 m (5' 4.02\").    Weight as of this encounter: 70.4 kg (155 lb 4.8 oz). Body surface area is 1.78 meters squared.  No Pain (0) Comment: Data Unavailable   No LMP recorded. Patient is not currently having periods (Reason: UNKNOWN).  Allergies reviewed: Yes  Medications reviewed: Yes    Medications: Medication refills not needed today.  Pharmacy name entered into EPIC: Data Unavailable    Clinical concerns: no new concerns     6 minutes for nursing intake (face to face time)     Sharon Hoover CMA                "

## 2017-10-16 ENCOUNTER — TRANSFERRED RECORDS (OUTPATIENT)
Dept: HEALTH INFORMATION MANAGEMENT | Facility: CLINIC | Age: 47
End: 2017-10-16

## 2018-01-25 ENCOUNTER — TELEPHONE (OUTPATIENT)
Dept: ONCOLOGY | Facility: CLINIC | Age: 48
End: 2018-01-25

## 2018-02-27 DIAGNOSIS — E55.9 VITAMIN D DEFICIENCY: ICD-10-CM

## 2018-02-28 RX ORDER — METHOCARBAMOL 750 MG/1
TABLET ORAL
Qty: 8 CAPSULE | Refills: 0 | OUTPATIENT
Start: 2018-02-28

## 2018-03-19 ENCOUNTER — TRANSFERRED RECORDS (OUTPATIENT)
Dept: HEALTH INFORMATION MANAGEMENT | Facility: CLINIC | Age: 48
End: 2018-03-19

## 2018-03-22 ENCOUNTER — TRANSFERRED RECORDS (OUTPATIENT)
Dept: HEALTH INFORMATION MANAGEMENT | Facility: CLINIC | Age: 48
End: 2018-03-22

## 2018-03-23 ENCOUNTER — TELEPHONE (OUTPATIENT)
Dept: ONCOLOGY | Facility: CLINIC | Age: 48
End: 2018-03-23

## 2018-03-23 NOTE — TELEPHONE ENCOUNTER
"Son calling reporting patient is bleeding and has been bleeding heavy for \"long time\" .  Son was asked to quantify this and he stated she has been bleeding on and off for a while and has been heavy for the last few months.  He notes she is changing her pad every 30-40 minutes  Inquired if pt was soaking the pad all the way through to which son agreed this was the case  Advised son that pt should be brought to ER at the [facility] to be evaluated   Gave him address to the hospital  Advised pt needs to brought there today so they evaluate her and make sure she does not need to be admitted  Son reports understanding    "

## 2018-03-28 ENCOUNTER — TELEPHONE (OUTPATIENT)
Dept: ONCOLOGY | Facility: CLINIC | Age: 48
End: 2018-03-28

## 2018-04-04 ENCOUNTER — TRANSFERRED RECORDS (OUTPATIENT)
Dept: HEALTH INFORMATION MANAGEMENT | Facility: CLINIC | Age: 48
End: 2018-04-04

## 2018-04-04 ENCOUNTER — TELEPHONE (OUTPATIENT)
Dept: ONCOLOGY | Facility: CLINIC | Age: 48
End: 2018-04-04

## 2018-04-04 NOTE — TELEPHONE ENCOUNTER
Pt's son called to report that pt has been soaking through a pad every hour for the last two weeks.  She is lightheaded and very fatigued.  Per Dinorah Edwards NP, pt needs to be seen in the emergency department.  Writer called pt and explain this to him.  He voiced understanding.  Writer called report to [facility] ED.

## 2018-04-05 ENCOUNTER — TELEPHONE (OUTPATIENT)
Dept: ONCOLOGY | Facility: CLINIC | Age: 48
End: 2018-04-05

## 2018-04-05 NOTE — TELEPHONE ENCOUNTER
4/5  Left message on voice mail at pt home number  Attempted to contact daughter Javier, message left on voice mail

## 2018-04-05 NOTE — TELEPHONE ENCOUNTER
Pt calling needing appt   Bleeding has decreased and is no longer heavy  No longer soaking pads  Changing 2-3 times per day  Pt is currently on provera  2 tabs BID  Was given this by ER and told to take until bleeding stops    Pt requesting appt with Dr. Gomez or another female MD  Offered appt on 4/26  Advised to call for refill of provera prior to having none left  stoping provera will cause bleeding to increase  If bleeding heavy again pt needs to go to ER at [facility]  Pt reports understanding

## 2018-04-09 NOTE — TELEPHONE ENCOUNTER
4/5/18  Spoke with pt appt scheduled for evaluation and to discuss surgery  Advised to go to ER if heavy bleeding

## 2018-04-26 ENCOUNTER — ONCOLOGY VISIT (OUTPATIENT)
Dept: ONCOLOGY | Facility: CLINIC | Age: 48
End: 2018-04-26
Attending: OBSTETRICS & GYNECOLOGY
Payer: COMMERCIAL

## 2018-04-26 VITALS
BODY MASS INDEX: 26.84 KG/M2 | HEIGHT: 64 IN | SYSTOLIC BLOOD PRESSURE: 108 MMHG | DIASTOLIC BLOOD PRESSURE: 71 MMHG | WEIGHT: 157.2 LBS | OXYGEN SATURATION: 98 % | HEART RATE: 74 BPM | TEMPERATURE: 98.7 F

## 2018-04-26 DIAGNOSIS — D64.9 ANEMIA: ICD-10-CM

## 2018-04-26 DIAGNOSIS — Z01.818 PRE-OP EXAM: Primary | ICD-10-CM

## 2018-04-26 DIAGNOSIS — Z01.818 PRE-OP EXAM: ICD-10-CM

## 2018-04-26 DIAGNOSIS — D62 ACUTE POSTHEMORRHAGIC ANEMIA: ICD-10-CM

## 2018-04-26 LAB
BASOPHILS # BLD AUTO: 0 10E9/L (ref 0–0.2)
BASOPHILS NFR BLD AUTO: 0.3 %
DIFFERENTIAL METHOD BLD: ABNORMAL
EOSINOPHIL # BLD AUTO: 0.1 10E9/L (ref 0–0.7)
EOSINOPHIL NFR BLD AUTO: 1.3 %
ERYTHROCYTE [DISTWIDTH] IN BLOOD BY AUTOMATED COUNT: 19 % (ref 10–15)
HCT VFR BLD AUTO: 35.4 % (ref 35–47)
HGB BLD-MCNC: 10.6 G/DL (ref 11.7–15.7)
IMM GRANULOCYTES # BLD: 0 10E9/L (ref 0–0.4)
IMM GRANULOCYTES NFR BLD: 0.3 %
LYMPHOCYTES # BLD AUTO: 1.3 10E9/L (ref 0.8–5.3)
LYMPHOCYTES NFR BLD AUTO: 33.1 %
MCH RBC QN AUTO: 25.9 PG (ref 26.5–33)
MCHC RBC AUTO-ENTMCNC: 29.9 G/DL (ref 31.5–36.5)
MCV RBC AUTO: 87 FL (ref 78–100)
MONOCYTES # BLD AUTO: 0.4 10E9/L (ref 0–1.3)
MONOCYTES NFR BLD AUTO: 10.3 %
NEUTROPHILS # BLD AUTO: 2.1 10E9/L (ref 1.6–8.3)
NEUTROPHILS NFR BLD AUTO: 54.7 %
NRBC # BLD AUTO: 0 10*3/UL
NRBC BLD AUTO-RTO: 0 /100
PLATELET # BLD AUTO: 373 10E9/L (ref 150–450)
RBC # BLD AUTO: 4.09 10E12/L (ref 3.8–5.2)
WBC # BLD AUTO: 3.9 10E9/L (ref 4–11)

## 2018-04-26 PROCEDURE — 85025 COMPLETE CBC W/AUTO DIFF WBC: CPT | Performed by: OBSTETRICS & GYNECOLOGY

## 2018-04-26 PROCEDURE — 36415 COLL VENOUS BLD VENIPUNCTURE: CPT | Performed by: OBSTETRICS & GYNECOLOGY

## 2018-04-26 PROCEDURE — G0463 HOSPITAL OUTPT CLINIC VISIT: HCPCS | Mod: ZF

## 2018-04-26 PROCEDURE — 99214 OFFICE O/P EST MOD 30 MIN: CPT | Mod: ZP | Performed by: OBSTETRICS & GYNECOLOGY

## 2018-04-26 RX ORDER — MEDROXYPROGESTERONE ACETATE 10 MG
TABLET ORAL
COMMUNITY
Start: 2018-03-22 | End: 2018-05-02

## 2018-04-26 ASSESSMENT — PAIN SCALES - GENERAL: PAINLEVEL: NO PAIN (0)

## 2018-04-26 NOTE — MR AVS SNAPSHOT
"              After Visit Summary   2018    Mahogany Whitman    MRN: [id number]           Patient Information     Date Of Birth          1970        Visit Information        Provider Department      2018 11:00 AM Dominique Gomez MD [facility]        Today's Diagnoses     Pre-op exam    -  1    Acute posthemorrhagic anemia        Anemia           Follow-ups after your visit        Who to contact     If you have questions or need follow up information about today's clinic visit or your schedule please contact [facility] directly at [phone].  Normal or non-critical lab and imaging results will be communicated to you by Unitywarehart, letter or phone within 4 business days after the clinic has received the results. If you do not hear from us within 7 days, please contact the clinic through Unitywarehart or phone. If you have a critical or abnormal lab result, we will notify you by phone as soon as possible.  Submit refill requests through byyd or call your pharmacy and they will forward the refill request to us. Please allow 3 business days for your refill to be completed.          Additional Information About Your Visit        MyChart Information     byyd lets you send messages to your doctor, view your test results, renew your prescriptions, schedule appointments and more. To sign up, go to [email or web address] . Click on \"Log in\" on the left side of the screen, which will take you to the Welcome page. Then click on \"Sign up Now\" on the right side of the page.     You will be asked to enter the access code listed below, as well as some personal information. Please follow the directions to create your username and password.     Your access code is: [email or web address]  Expires: 2018  6:31 AM     Your access code will  in 90 days. If you need help or a new code, please call your [place] clinic or [phone].        Care EveryWhere ID     This is " "your Care EveryWhere ID. This could be used by other organizations to access your [place] medical records  [id number]        Your Vitals Were     Pulse Temperature Height Pulse Oximetry BMI (Body Mass Index)       74 98.7  F (37.1  C) 1.626 m (5' 4.02\") 98% 26.97 kg/m2        Blood Pressure from Last 3 Encounters:   04/26/18 108/71   10/05/17 118/77   09/18/17 105/72    Weight from Last 3 Encounters:   04/26/18 71.3 kg (157 lb 3.2 oz)   10/05/17 70.4 kg (155 lb 4.8 oz)   09/18/17 70.8 kg (156 lb)              We Performed the Following     Inessa-Operative Worksheet - Dilation and Curettage, under ultrasound guidance        Primary Care Provider Office Phone # Fax #    Corinna Neely [phone]       [address] [place] [phone]        Equal Access to Services     [facility] : Hadii reena musa hadasho Soomaali, waaxda luqadaha, qaybta kaalmada adeegyada, radhames bautista . So [facility] [phone].    ATENCIÓN: Si habla español, tiene a iverson disposición servicios gratuitos de asistencia lingüística. [facility] [phone].    We comply with applicable federal civil rights laws and [place] laws. We do not discriminate on the basis of race, color, national origin, age, disability, sex, sexual orientation, or gender identity.            Thank you!     Thank you for choosing [facility]  for your care. Our goal is always to provide you with excellent care. Hearing back from our patients is one way we can continue to improve our services. Please take a few minutes to complete the written survey that you may receive in the mail after your visit with us. Thank you!             Your Updated Medication List - Protect others around you: Learn how to safely use, store and throw away your medicines at www.disposemymeds.org.          This list is accurate as of 4/26/18 11:59 PM.  Always use your most recent med list.                   Brand Name " Dispense Instructions for use Diagnosis    * cholecalciferol 1000 UNIT tablet    vitamin D3    100 tablet    Take 1 tablet (1,000 Units) by mouth daily    Vitamin D deficiency       * cholecalciferol 71652 units capsule    VITAMIN D3    8 capsule    Take 1 capsule (50,000 Units) by mouth once a week    Vitamin D deficiency       ciprofloxacin 500 MG tablet    CIPRO    6 tablet    Take 1 tablet (500 mg) by mouth 2 times daily    Dysuria       docusate sodium 100 MG capsule    COLACE    60 capsule    Take 1-2 capsules daily as needed for constipation    Chronic constipation       ferrous sulfate 325 (65 Fe) MG tablet    IRON    60 tablet    Take 1 tablet (325 mg) by mouth 2 times daily    Anemia, unspecified type       medroxyPROGESTERone 10 MG tablet    PROVERA     Take 2 tablets (20 mg) by mouth twice daily. Until bleeding stops. Then take 2 tablets (20 mg) once daily.        * Notice:  This list has 2 medication(s) that are the same as other medications prescribed for you. Read the directions carefully, and ask your doctor or other care provider to review them with you.

## 2018-04-26 NOTE — PROGRESS NOTES
"                        Consult Notes on Referred Patient    Date: 2018       Dr. Christine Gutierrez MD  [address] [place] [address]       RE: Mahogany Whitman  : 1970  SAVANNAH: 2018    Dear Dr. Christine Gutierrez:    I had the pleasure of seeing your patient Mahogany Whitman here at the Gynecologic Cancer Clinic at the [facility] on 2018.  As you know she is a very pleasant 42 year old woman (chart states age as 47, pt reports she is actually 42) with a recent diagnosis of Complex hyperplasia with atypia.     Brief History:  Mahogany Whitman is a 46 yr old female, , who presented originall for evaluation of abnormal uterine bleeding.    Mahogany states that her abnormal bleeding started 1 year ago.  She notes that she has menses are regular intervals with heavy bleeding that lasts 3-4 days, changing her tampon 3-4 times per day.  She has intermenstrual bleeding for 3+ weeks between periods, though, with light and \"stringy\"/ \"sticky\" blood. Mahogany states her LMP was 2017, but she has had intermittent bleeding, most days, since then.     2017:   SPECIMEN(S):   Endometrial biopsy     FINAL DIAGNOSIS:   ENDOMETRIUM, BIOPSY:   - Endometrial atypical hyperplasia       17: Pelvic US   Uterine findings:                        Presence: Visible Size: Normal 5.7 x 6.5 x 5.2cm , bulky anterior uterus  Endometrium = 4.4 mm.                        Cx length = 35.7 mm.                            Flexion:  Anteverted              Position: Midline                    Margins: Smooth                   Shape: Normal                        Contour: Regular                  Texture: Heterogeneous                   Cavity: Normal                      Masses: Normal     Pelvic findings:                         Right Adnexa: Normal                        Left Adnexa: Normal                        Bladder:  Normal                                                           "                                                   Cul - de - sac fluid: None     Ovarian follicles:                        Right ovary:  2.9 x 2.5 x 1.0cm.                        0 follicles                        Left ovary:  Not visualized       Last pap smear: 2011 NIL  CBC done on 5/19/2017: Hgb 10.4, normal platelets  - Mahogany was prescribed an iron supplement, but has not been taking it regularly    10/2017: [facility]  Diagnosis: disordered proliferative endometrium with focal stromal breakdown admixed with abundant blood. No evidence of hyperplasia or malignancy in specimen.     4/6/2018: endometrial biopsy  Fragments of secretory endometrium, 27-28 day, post ovulatory day 13-14. No hyperplasia or malignancy noted.     Today: Mahogany presents to clinic today feeling well. She is concerned regarding excessive vaginal bleeding. She reports vaginal bleeding starting 3/12/18, continuing until 4/23/18. She has had 2 endometrial biopsies. In October there was no hyperplasia noted. Due to contniued vaginal bleeding, I am recommending D&C hysteroscopy with Mirena IUD placement and possible polypectomy. Pt and son in agreement with recommendation. Mahogany is otherwise feeling well. She reports some fatigue, she is worried about her HGB levels which was 11.1 today. She denies chest pain or SOB, She reports some R sided flank pain and abdominal cramping associated with bleeding. No urinary issues. She is eating and drinking well.     Review of Systems:  Systemic           no weight changes; no fever; no chills; no night sweats; no appetite changes  Skin           no rashes, or lesions  Eye           no irritation; no changes in vision  Harinder-Laryngeal           no dysphagia; no hoarseness   Pulmonary    no cough; no shortness of breath  Cardiovascular    no chest pain; no palpitations  Gastrointestinal    no diarrhea; no constipation; no abdominal pain; no changes in bowel  habits; no blood in stool  Genitourinary   no  urinary frequency; no urinary urgency; no dysuria; no pain; no abnormal vaginal discharge; no abnormal vaginal bleeding  Breast   no breast discharge; no breast changes; no breast pain  Musculoskeletal    no myalgias; no arthralgias; no back pain  Psychiatric           no depressed mood; no anxiety    Hematologic           no tender lymph nodes; no noticeable swellings or lumps   Endocrine    no hot flashes; no heat/cold intolerance         Neurological   no tremor; no numbness and tingling; no headaches; no difficulty  sleeping    I have reviewed and addressed the patient's review of symptoms for today's visit.     Past Medical History:    Past Medical History:   Diagnosis Date     Gestational diabetes        Past Surgical History:    Past Surgical History:   Procedure Laterality Date     NO HISTORY OF SURGERY         Health Maintenance:  Health Maintenance Due   Topic Date Due     LIPID SCREEN Q5 YR FEMALE (SYSTEM ASSIGNED)  10/21/2016     INFLUENZA VACCINE (1) 09/01/2017       Last Pap Smear:   Lab Results   Component Value Date    PAP NIL 08/11/2017   She has not had a history of abnormal Pap smears.    Last Mammogram: none    Last Colonoscopy: None                     Last Thyroid Screening:        TSH   Date Value Ref Range Status   08/11/2017 0.78 0.40 - 4.00 mU/L Final       Last Cholest Screening:        LDL Cholesterol Direct (mg/dL)   Date Value   03/17/2011 70       Current Medications:     has a current medication list which includes the following prescription(s): cholecalciferol, cholecalciferol, ciprofloxacin, docusate sodium, ferrous gluconate, and ferrous sulfate.     Allergies:      No Known Allergies      Social History:     Social History   Substance Use Topics     Smoking status: Never Smoker     Smokeless tobacco: Never Used     Alcohol use No       History   Drug Use No       Family History:     Family History   Problem Relation Age of Onset     Asthma No family hx of      C.A.D. No family hx  "of      DIABETES No family hx of      Hypertension No family hx of      Breast Cancer No family hx of      Cancer - colorectal No family hx of        Physical Exam:     /71 (BP Location: Right arm, Patient Position: Chair, Cuff Size: Adult Regular)  Pulse 74  Temp 98.7  F (37.1  C)  Ht 1.626 m (5' 4.02\")  Wt 71.3 kg (157 lb 3.2 oz)  SpO2 98%  BMI 26.97 kg/m2  Body mass index is 26.97 kg/(m^2).    General Appearance: healthy and alert, no distress  Lungs CTA B  Heart RRR  Abdomen soft, NT, ND  Ext: No CCE     Psychiatric: appropriate mood and affect                               :   Declined exam at this time.    Assessment:    Mahogany Whitman is a 47 year old woman with a recent diagnosis of Complex hyperplasia with atypia.       Plan:    1.)   Complex hyperplasia with atypia: treatment options were discussed with patient and son in detail. Options included TLH-BSO, treatment with oral progesterone or Mirena IUD. Will plan for D&C hysteroscopy, possible polypectomy with uterine morcellator, placement of Mirena IUD. Written and verbal consent obtained today. Patient also requested HGB check today. Risk for cancer explained to patient and son in detail. Reviewed signs and symptoms for when she should contact the clinic or seek additional care. Patient to contact the clinic with any questions or concerns in the interim.     2.) Genetic risk factors were assessed and the patient does not meet the qualifications for a referral.      3.) Labs and/or tests ordered include:  CBC.     4.) Health maintenance issues addressed today include none.    5.) Patient to RTC to see me in 3 months.     6.) Pre-op teaching was completed today. Risks of surgery were discussed to include: bleeding, transfusion, infection, unintentional injury to surrounding organs/structures.       Thank you for allowing us to participate in the care of your patient.       Sincerely,    Dominique Gomez MD    Department " of Ob/Gyn and Women's Health  Division of Gynecologic Oncology  [facility]  [phone]     Of a  30 minute appointment, more than 50% was spent in counseling the patient regarding risks of retaining uterus including 30-40% chance of finding endometrial cancer at the time of the surgery. Patient elicited understanding of the above as did her son.    CC  Patient Care Team:  Corinna Neely MD as PCP - General (Family Practice)  Rebecca Truong MD as Resident (Student in organized health care education/training program)  Vinod Mock MD as MD (Family Practice)  Meka Gomes MD as Referring Physician (Family Practice)  Padmini Garcia MD as MD (OB/Gyn)  SONNY FLORIAN Morgan Faulkner, am serving as a scribe to document services personally performed by Dominique Gomez MD, based upon my observations and the provider's statements to me. All documentation has been reviewed by the aforementioned doctor prior to being entered into the official medical record.     I have reviewed the above note and agree with the scribe's notation as written.

## 2018-04-26 NOTE — NURSING NOTE
"Oncology Rooming Note    April 26, 2018 11:41 AM   Mahogany Whitman is a 47 year old female who presents for:    Chief Complaint   Patient presents with     Oncology Clinic Visit     dysfunctional uterine bleeding f/u     Initial Vitals: /71 (BP Location: Right arm, Patient Position: Chair, Cuff Size: Adult Regular)  Pulse 74  Temp 98.7  F (37.1  C)  Ht 1.626 m (5' 4.02\")  Wt 71.3 kg (157 lb 3.2 oz)  SpO2 98%  BMI 26.97 kg/m2 Estimated body mass index is 26.97 kg/(m^2) as calculated from the following:    Height as of this encounter: 1.626 m (5' 4.02\").    Weight as of this encounter: 71.3 kg (157 lb 3.2 oz). Body surface area is 1.79 meters squared.  No Pain (0) Comment: Data Unavailable   No LMP recorded. Patient is not currently having periods (Reason: UNKNOWN).  Allergies reviewed: Yes  Medications reviewed: Yes    Medications: MEDICATION REFILLS NEEDED TODAY. Provider was notified.  Pharmacy name entered into [facility]: [facility] [place], [place] - [address]    Clinical concerns:  Patricia was notified.    10 minutes for nursing intake (face to face time)     Shawn Irwin CMA              "

## 2018-04-26 NOTE — LETTER
"2018       RE: Mahogany Whitman  [address] [facility] [address]     Dear Colleague,    Thank you for referring your patient, Mahogany Whitman, to the [facility]. Please see a copy of my visit note below.                            Consult Notes on Referred Patient    Date: 2018       Dr. Christine Gutierrez MD  [address] [place] [address]       RE: Mahogany Whitman  : 1970  SAVANNAH: 2018    Dear Dr. Christine Gutierrez:    I had the pleasure of seeing your patient Mahogany Whitman here at the Gynecologic Cancer Clinic at the [facility] on 2018.  As you know she is a very pleasant 42 year old woman (chart states age as 47, pt reports she is actually 42) with a recent diagnosis of Complex hyperplasia with atypia.     Brief History:  Mahogany Whitman is a 46 yr old female, , who presented originall for evaluation of abnormal uterine bleeding.    Jose Aa states that her abnormal bleeding started 1 year ago.  She notes that she has menses are regular intervals with heavy bleeding that lasts 3-4 days, changing her tampon 3-4 times per day.  She has intermenstrual bleeding for 3+ weeks between periods, though, with light and \"stringy\"/ \"sticky\" blood. Mahogany states her LMP was 2017, but she has had intermittent bleeding, most days, since then.     2017:   SPECIMEN(S):   Endometrial biopsy     FINAL DIAGNOSIS:   ENDOMETRIUM, BIOPSY:   - Endometrial atypical hyperplasia       17: Pelvic US   Uterine findings:                        Presence: Visible Size: Normal 5.7 x 6.5 x 5.2cm , bulky anterior uterus  Endometrium = 4.4 mm.                        Cx length = 35.7 mm.                            Flexion:  Anteverted              Position: Midline                    Margins: Smooth                   Shape: Normal                        Contour: Regular                  Texture: Heterogeneous                 "   Cavity: Normal                      Masses: Normal     Pelvic findings:                         Right Adnexa: Normal                        Left Adnexa: Normal                        Bladder:  Normal                                                                                                             Cul - de - sac fluid: None     Ovarian follicles:                        Right ovary:  2.9 x 2.5 x 1.0cm.                        0 follicles                        Left ovary:  Not visualized       Last pap smear: 2011 NIL  CBC done on 5/19/2017: Hgb 10.4, normal platelets  - Mahogany was prescribed an iron supplement, but has not been taking it regularly    10/2017: [facility]  Diagnosis: disordered proliferative endometrium with focal stromal breakdown admixed with abundant blood. No evidence of hyperplasia or malignancy in specimen.     4/6/2018: endometrial biopsy  Fragments of secretory endometrium, 27-28 day, post ovulatory day 13-14. No hyperplasia or malignancy noted.     Today: Mahogany presents to clinic today feeling well. She is concerned regarding excessive vaginal bleeding. She reports vaginal bleeding starting 3/12/18, continuing until 4/23/18. She has had 2 endometrial biopsies. In October there was no hyperplasia noted. Due to contniued vaginal bleeding, I am recommending D&C hysteroscopy with Mirena IUD placement and possible polypectomy. Pt and son in agreement with recommendation. Mahogany is otherwise feeling well. She reports some fatigue, she is worried about her HGB levels which was 11.1 today. She denies chest pain or SOB, She reports some R sided flank pain and abdominal cramping associated with bleeding. No urinary issues. She is eating and drinking well.     Review of Systems:  Systemic           no weight changes; no fever; no chills; no night sweats; no appetite changes  Skin           no rashes, or lesions  Eye           no irritation; no changes in vision  Harinder-Laryngeal           no  dysphagia; no hoarseness   Pulmonary    no cough; no shortness of breath  Cardiovascular    no chest pain; no palpitations  Gastrointestinal    no diarrhea; no constipation; no abdominal pain; no changes in bowel  habits; no blood in stool  Genitourinary   no urinary frequency; no urinary urgency; no dysuria; no pain; no abnormal vaginal discharge; no abnormal vaginal bleeding  Breast   no breast discharge; no breast changes; no breast pain  Musculoskeletal    no myalgias; no arthralgias; no back pain  Psychiatric           no depressed mood; no anxiety    Hematologic           no tender lymph nodes; no noticeable swellings or lumps   Endocrine    no hot flashes; no heat/cold intolerance         Neurological   no tremor; no numbness and tingling; no headaches; no difficulty  sleeping    I have reviewed and addressed the patient's review of symptoms for today's visit.     Past Medical History:    Past Medical History:   Diagnosis Date     Gestational diabetes        Past Surgical History:    Past Surgical History:   Procedure Laterality Date     NO HISTORY OF SURGERY         Health Maintenance:  Health Maintenance Due   Topic Date Due     LIPID SCREEN Q5 YR FEMALE (SYSTEM ASSIGNED)  10/21/2016     INFLUENZA VACCINE (1) 09/01/2017       Last Pap Smear:   Lab Results   Component Value Date    PAP NIL 08/11/2017   She has not had a history of abnormal Pap smears.    Last Mammogram: none    Last Colonoscopy: None                     Last Thyroid Screening:        TSH   Date Value Ref Range Status   08/11/2017 0.78 0.40 - 4.00 mU/L Final       Last Cholest Screening:        LDL Cholesterol Direct (mg/dL)   Date Value   03/17/2011 70       Current Medications:     has a current medication list which includes the following prescription(s): cholecalciferol, cholecalciferol, ciprofloxacin, docusate sodium, ferrous gluconate, and ferrous sulfate.     Allergies:      No Known Allergies      Social History:     Social History  "  Substance Use Topics     Smoking status: Never Smoker     Smokeless tobacco: Never Used     Alcohol use No       History   Drug Use No       Family History:     Family History   Problem Relation Age of Onset     Asthma No family hx of      C.A.D. No family hx of      DIABETES No family hx of      Hypertension No family hx of      Breast Cancer No family hx of      Cancer - colorectal No family hx of        Physical Exam:     /71 (BP Location: Right arm, Patient Position: Chair, Cuff Size: Adult Regular)  Pulse 74  Temp 98.7  F (37.1  C)  Ht 1.626 m (5' 4.02\")  Wt 71.3 kg (157 lb 3.2 oz)  SpO2 98%  BMI 26.97 kg/m2  Body mass index is 26.97 kg/(m^2).    General Appearance: healthy and alert, no distress  Lungs CTA B  Heart RRR  Abdomen soft, NT, ND  Ext: No CCE     Psychiatric: appropriate mood and affect                               :   Declined exam at this time.    Assessment:    Mahogany Whitman is a 47 year old woman with a recent diagnosis of Complex hyperplasia with atypia.       Plan:    1.)   Complex hyperplasia with atypia: treatment options were discussed with patient and son in detail. Options included TLH-BSO, treatment with oral progesterone or Mirena IUD. Will plan for D&C hysteroscopy, possible polypectomy with uterine morcellator, placement of Mirena IUD. Written and verbal consent obtained today. Patient also requested HGB check today. Risk for cancer explained to patient and son in detail. Reviewed signs and symptoms for when she should contact the clinic or seek additional care. Patient to contact the clinic with any questions or concerns in the interim.     2.) Genetic risk factors were assessed and the patient does not meet the qualifications for a referral.      3.) Labs and/or tests ordered include:  CBC.     4.) Health maintenance issues addressed today include none.    5.) Patient to RTC to see me in 3 months.     6.) Pre-op teaching was completed today. Risks of surgery " were discussed to include: bleeding, transfusion, infection, unintentional injury to surrounding organs/structures.       Thank you for allowing us to participate in the care of your patient.       Sincerely,    Dominique Gomez MD    Department of Ob/Gyn and Women's Health  Division of Gynecologic Oncology  [facility]  [phone]     Of a  30 minute appointment, more than 50% was spent in counseling the patient regarding risks of retaining uterus including 30-40% chance of finding endometrial cancer at the time of the surgery. Patient elicited understanding of the above as did her son.    CC  Patient Care Team:  Corinna Neely MD as PCP - General (Family Practice)  Rebecca Truong MD as Resident (Student in organized health care education/training program)  Vinod Mock MD as MD (Family Practice)  Meka Gomes MD as Referring Physician (Family Practice)  Padmini Garcia MD as MD (OB/Gyn)  SONNY FLORIAN, Federico Ware, am serving as a scribe to document services personally performed by Dominique Gomez MD, based upon my observations and the provider's statements to me. All documentation has been reviewed by the aforementioned doctor prior to being entered into the official medical record.     I have reviewed the above note and agree with the scribe's notation as written.      Again, thank you for allowing me to participate in the care of your patient.      Sincerely,    Dominique Gomez MD

## 2018-04-27 RX ORDER — ACETAMINOPHEN 325 MG/1
975 TABLET ORAL ONCE
Status: CANCELLED | OUTPATIENT
Start: 2018-04-27 | End: 2018-04-27

## 2018-04-27 RX ORDER — KETOROLAC TROMETHAMINE 30 MG/ML
30 INJECTION, SOLUTION INTRAMUSCULAR; INTRAVENOUS ONCE
Status: CANCELLED | OUTPATIENT
Start: 2018-04-27 | End: 2018-04-27

## 2018-05-01 ENCOUNTER — ANESTHESIA EVENT (OUTPATIENT)
Dept: SURGERY | Facility: AMBULATORY SURGERY CENTER | Age: 48
End: 2018-05-01

## 2018-05-02 ENCOUNTER — SURGERY (OUTPATIENT)
Age: 48
End: 2018-05-02

## 2018-05-02 ENCOUNTER — HOSPITAL ENCOUNTER (OUTPATIENT)
Facility: AMBULATORY SURGERY CENTER | Age: 48
End: 2018-05-02
Attending: OBSTETRICS & GYNECOLOGY
Payer: COMMERCIAL

## 2018-05-02 ENCOUNTER — ANESTHESIA (OUTPATIENT)
Dept: SURGERY | Facility: AMBULATORY SURGERY CENTER | Age: 48
End: 2018-05-02

## 2018-05-02 VITALS
TEMPERATURE: 98 F | HEART RATE: 60 BPM | SYSTOLIC BLOOD PRESSURE: 115 MMHG | OXYGEN SATURATION: 98 % | DIASTOLIC BLOOD PRESSURE: 71 MMHG | BODY MASS INDEX: 26.8 KG/M2 | HEIGHT: 64 IN | WEIGHT: 157 LBS | RESPIRATION RATE: 16 BRPM

## 2018-05-02 DIAGNOSIS — D64.9 ANEMIA: ICD-10-CM

## 2018-05-02 LAB
HCG UR QL: NEGATIVE
INTERNAL QC OK POCT: YES

## 2018-05-02 DEVICE — IUD CONTRACEPTIVE DEVICE MIRENA 50419-4230-01: Type: IMPLANTABLE DEVICE | Status: FUNCTIONAL

## 2018-05-02 RX ORDER — KETOROLAC TROMETHAMINE 30 MG/ML
30 INJECTION, SOLUTION INTRAMUSCULAR; INTRAVENOUS ONCE
Status: COMPLETED | OUTPATIENT
Start: 2018-05-02 | End: 2018-05-02

## 2018-05-02 RX ORDER — ONDANSETRON 2 MG/ML
INJECTION INTRAMUSCULAR; INTRAVENOUS PRN
Status: DISCONTINUED | OUTPATIENT
Start: 2018-05-02 | End: 2018-05-02

## 2018-05-02 RX ORDER — LIDOCAINE HYDROCHLORIDE 10 MG/ML
INJECTION, SOLUTION INFILTRATION; PERINEURAL PRN
Status: DISCONTINUED | OUTPATIENT
Start: 2018-05-02 | End: 2018-05-02 | Stop reason: HOSPADM

## 2018-05-02 RX ORDER — ACETAMINOPHEN 325 MG/1
650 TABLET ORAL
Status: DISCONTINUED | OUTPATIENT
Start: 2018-05-02 | End: 2018-05-03 | Stop reason: HOSPADM

## 2018-05-02 RX ORDER — DEXAMETHASONE SODIUM PHOSPHATE 4 MG/ML
INJECTION, SOLUTION INTRA-ARTICULAR; INTRALESIONAL; INTRAMUSCULAR; INTRAVENOUS; SOFT TISSUE PRN
Status: DISCONTINUED | OUTPATIENT
Start: 2018-05-02 | End: 2018-05-02

## 2018-05-02 RX ORDER — ACETAMINOPHEN 325 MG/1
975 TABLET ORAL ONCE
Status: COMPLETED | OUTPATIENT
Start: 2018-05-02 | End: 2018-05-02

## 2018-05-02 RX ORDER — PROPOFOL 10 MG/ML
INJECTION, EMULSION INTRAVENOUS CONTINUOUS PRN
Status: DISCONTINUED | OUTPATIENT
Start: 2018-05-02 | End: 2018-05-02

## 2018-05-02 RX ORDER — FENTANYL CITRATE 50 UG/ML
INJECTION, SOLUTION INTRAMUSCULAR; INTRAVENOUS PRN
Status: DISCONTINUED | OUTPATIENT
Start: 2018-05-02 | End: 2018-05-02

## 2018-05-02 RX ORDER — SODIUM CHLORIDE, SODIUM LACTATE, POTASSIUM CHLORIDE, CALCIUM CHLORIDE 600; 310; 30; 20 MG/100ML; MG/100ML; MG/100ML; MG/100ML
INJECTION, SOLUTION INTRAVENOUS CONTINUOUS
Status: DISCONTINUED | OUTPATIENT
Start: 2018-05-02 | End: 2018-05-03 | Stop reason: HOSPADM

## 2018-05-02 RX ORDER — NALOXONE HYDROCHLORIDE 0.4 MG/ML
.1-.4 INJECTION, SOLUTION INTRAMUSCULAR; INTRAVENOUS; SUBCUTANEOUS
Status: DISCONTINUED | OUTPATIENT
Start: 2018-05-02 | End: 2018-05-03 | Stop reason: HOSPADM

## 2018-05-02 RX ORDER — ONDANSETRON 4 MG/1
4 TABLET, ORALLY DISINTEGRATING ORAL
Status: DISCONTINUED | OUTPATIENT
Start: 2018-05-02 | End: 2018-05-03 | Stop reason: HOSPADM

## 2018-05-02 RX ORDER — LIDOCAINE 40 MG/G
CREAM TOPICAL
Status: DISCONTINUED | OUTPATIENT
Start: 2018-05-02 | End: 2018-05-02 | Stop reason: HOSPADM

## 2018-05-02 RX ORDER — ACETAMINOPHEN 325 MG/1
975 TABLET ORAL ONCE
Status: DISCONTINUED | OUTPATIENT
Start: 2018-05-02 | End: 2018-05-02 | Stop reason: HOSPADM

## 2018-05-02 RX ORDER — OXYCODONE HYDROCHLORIDE 5 MG/1
5 TABLET ORAL
Status: COMPLETED | OUTPATIENT
Start: 2018-05-02 | End: 2018-05-02

## 2018-05-02 RX ORDER — SODIUM CHLORIDE, SODIUM LACTATE, POTASSIUM CHLORIDE, CALCIUM CHLORIDE 600; 310; 30; 20 MG/100ML; MG/100ML; MG/100ML; MG/100ML
INJECTION, SOLUTION INTRAVENOUS CONTINUOUS
Status: DISCONTINUED | OUTPATIENT
Start: 2018-05-02 | End: 2018-05-02 | Stop reason: HOSPADM

## 2018-05-02 RX ORDER — ONDANSETRON 4 MG/1
4 TABLET, ORALLY DISINTEGRATING ORAL EVERY 30 MIN PRN
Status: DISCONTINUED | OUTPATIENT
Start: 2018-05-02 | End: 2018-05-03 | Stop reason: HOSPADM

## 2018-05-02 RX ORDER — ONDANSETRON 2 MG/ML
4 INJECTION INTRAMUSCULAR; INTRAVENOUS EVERY 30 MIN PRN
Status: DISCONTINUED | OUTPATIENT
Start: 2018-05-02 | End: 2018-05-03 | Stop reason: HOSPADM

## 2018-05-02 RX ORDER — PROPOFOL 10 MG/ML
INJECTION, EMULSION INTRAVENOUS PRN
Status: DISCONTINUED | OUTPATIENT
Start: 2018-05-02 | End: 2018-05-02

## 2018-05-02 RX ORDER — GABAPENTIN 300 MG/1
300 CAPSULE ORAL ONCE
Status: COMPLETED | OUTPATIENT
Start: 2018-05-02 | End: 2018-05-02

## 2018-05-02 RX ORDER — FENTANYL CITRATE 50 UG/ML
25-50 INJECTION, SOLUTION INTRAMUSCULAR; INTRAVENOUS
Status: DISCONTINUED | OUTPATIENT
Start: 2018-05-02 | End: 2018-05-02 | Stop reason: HOSPADM

## 2018-05-02 RX ADMIN — SODIUM CHLORIDE, SODIUM LACTATE, POTASSIUM CHLORIDE, CALCIUM CHLORIDE: 600; 310; 30; 20 INJECTION, SOLUTION INTRAVENOUS at 11:09

## 2018-05-02 RX ADMIN — KETOROLAC TROMETHAMINE 30 MG: 30 INJECTION, SOLUTION INTRAMUSCULAR; INTRAVENOUS at 11:07

## 2018-05-02 RX ADMIN — PROPOFOL 50 MG: 10 INJECTION, EMULSION INTRAVENOUS at 12:15

## 2018-05-02 RX ADMIN — LIDOCAINE HYDROCHLORIDE 10 ML: 10 INJECTION, SOLUTION INFILTRATION; PERINEURAL at 12:32

## 2018-05-02 RX ADMIN — OXYCODONE HYDROCHLORIDE 5 MG: 5 TABLET ORAL at 13:08

## 2018-05-02 RX ADMIN — DEXAMETHASONE SODIUM PHOSPHATE 4 MG: 4 INJECTION, SOLUTION INTRA-ARTICULAR; INTRALESIONAL; INTRAMUSCULAR; INTRAVENOUS; SOFT TISSUE at 12:15

## 2018-05-02 RX ADMIN — FENTANYL CITRATE 50 MCG: 50 INJECTION, SOLUTION INTRAMUSCULAR; INTRAVENOUS at 12:11

## 2018-05-02 RX ADMIN — GABAPENTIN 300 MG: 300 CAPSULE ORAL at 10:56

## 2018-05-02 RX ADMIN — ONDANSETRON 4 MG: 2 INJECTION INTRAMUSCULAR; INTRAVENOUS at 12:15

## 2018-05-02 RX ADMIN — PROPOFOL 100 MCG/KG/MIN: 10 INJECTION, EMULSION INTRAVENOUS at 12:15

## 2018-05-02 RX ADMIN — ACETAMINOPHEN 975 MG: 325 TABLET ORAL at 10:56

## 2018-05-02 NOTE — IP AVS SNAPSHOT
[facility]    [address] [place] [address]    Phone:  [phone]    Fax:  [phone]                                       After Visit Summary   5/2/2018    Mahogany Whitman    MRN: [id number]           After Visit Summary Signature Page     I have received my discharge instructions, and my questions have been answered. I have discussed any challenges I see with this plan with the nurse or doctor.    ..........................................................................................................................................  Patient/Patient Representative Signature      ..........................................................................................................................................  Patient Representative Print Name and Relationship to Patient    ..................................................               ................................................  Date                                            Time    ..........................................................................................................................................  Reviewed by Signature/Title    ...................................................              ..............................................  Date                                                            Time

## 2018-05-02 NOTE — IP AVS SNAPSHOT
MRN:[id number]                      After Visit Summary   5/2/2018    Mahogany Whitman    MRN: [id number]           Thank you!     Thank you for choosing [facility] for your care. Our goal is always to provide you with excellent care. Hearing back from our patients is one way we can continue to improve our services. Please take a few minutes to complete the written survey that you may receive in the mail after you visit with us. Thank you!        Patient Information     Date Of Birth          1970        About your hospital stay     You were admitted on:  May 2, 2018 [facility]    You were discharged on:  May 2, 2018       Who to Call     For medical emergencies, please call 911.  For non-urgent questions about your medical care, please call your primary care provider or clinic, [phone]  For questions related to your surgery, please call your surgery clinic        Attending Provider     Provider Specialty    Dominique Gomez MD Oncology       Primary Care Provider Office Phone # Fax #    Corinna Neely [phone]      After Care Instructions     Discharge Instructions       Resume pre procedure diet            Discharge Instructions       Pelvic Rest. No tampons, douching or intercourse for  2  weeks.            Discharge Instructions       Patient to arrange follow up appointment in 2-4  weeks            Discharge Instructions       For cramping pain you may take ibuprofen 600 mg every 6 hours.  For additional pain, may also take tylenol 500 mg every 6 hours alternating the 2 medications.  Call if you experience heavy bleeding (>1 pad per hour), fevers, chills or unable to control your pain.            Ice to affected area       PRN as tolerated            No alcohol       NO ALCOHOL for 24 hours post procedure            No driving or operating machinery       No driving or operating machinery  until day after procedure            Shower        May shower on Post-op day  0.   DO NOT take a bath                  Further instructions from your care team       [facility]  Home Care Following Anesthesia  For 24 hours after surgery:  1. Get plenty of rest.  A responsible adult must stay with you for at least 24 hours after you leave the surgery center.  2. Do not drive or use heavy equipment.  If you have weakness or tingling, don't drive or use heavy equipment until this feeling goes away.   3. Do not drink alcohol.   4. Avoid strenuous or risky activities.  Ask for help when climbing stairs.  5. You may feel lightheaded.  IF so, sit for a few minutes before standing.  Have someone help you get up.   6. If you have nausea (feel sick to your stomach): Drink only clear liquids such as apple juice, ginger ale, broth or 7-Up.  Rest may also help.  Be sure to drink enough fluids.  Move to a regular diet as you feel able.   7. You may have a slight fever.  Call the doctor if your fever is over 100 F (37.7 C) (taken under the tongue) or lasts longer than 24 hours.  8. You may have a dry mouth, a sore throat, muscle aches or trouble sleeping. These should go away after 24 hours.  9. Do not make important or legal decisions.             Tips for taking pain medications  To get the best pain relief possible, remember these points:    Take pain medications as directed, before pain becomes severe.    Pain medication can upset your stomach: taking it with food may help.    Constipation is a common side effect of pain medication. Drink plenty of  fluids.    Eat foods high in fiber. Take a stool softener if recommended by your doctor or pharmacist.    Do not drink alcohol, drive or operate machinery while taking pain medications.    Ask about other ways to control pain, such as with heat, ice or relaxation.    Tylenol/Acetaminophen Consumption  To help encourage the safe use of  "acetaminophen, the makers of TYLENOL  have lowered the maximum daily dose for single-ingredient Extra Strength TYLENOL  (acetaminophen) products sold in the [place] from 8 pills per day (4,000 mg) to 6 pills per day (3,000 mg). The dosing interval has also changed from 2 pills every 4-6 hours to 2 pills every 6 hours.    If you feel your pain relief is insufficient, you may take Tylenol/Acetaminophen in addition to your narcotic pain medication.     Be careful not to exceed 3,000 mg of Tylenol/Acetaminophen in a 24 hour period from all sources.    If you are taking extra strength Tylenol/acetaminophen (500 mg), the maximum dose is 6 tablets in 24 hours.    If you are taking regular strength acetaminophen (325 mg), the maximum dose is 9 tablets in 24 hours.    Call a doctor for any of the followin. Signs of infection (fever, growing tenderness at the surgery site, a large amount of drainage or bleeding, severe pain, foul-smelling drainage, redness, swelling).  2. It has been over 8 to 10 hours since surgery and you are still not able to urinate (pass water).  3. Headache for over 24 hours.    Your doctor is:  Dr. Dominique Gomez, Gynecologic Oncology: [phone]                  Or dial [phone] and ask for the resident on call for:  Gynecologic Oncology  For emergency care, call the:  [facility]:  [phone] (TTY for hearing impaired: [phone])                Pending Results     No orders found from 2018 to 5/3/2018.            Admission Information     Date & Time Provider Department Dept. Phone    2018 Dominique Gomez MD [facility] [phone]      Your Vitals Were     Blood Pressure Pulse Temperature Respirations Height Weight    111/74 78 98.2  F (36.8  C) (Temporal) 16 1.626 m (5' 4\") 71.2 kg (157 lb)    Last Period Pulse Oximetry BMI (Body Mass Index)             2018 98% 26.95 kg/m2         MyChart Information     MyChart is an " electronic gateway that provides easy, online access to your medical records. With [employer], you can request a clinic appointment, read your test results, renew a prescription or communicate with your care team.     To sign up for [employer] visit the website at [email or web address]   You will be asked to enter the access code listed below, as well as some personal information. Please follow the directions to create your username and password.     Your access code is: [email or web address]  Expires: 2018  6:31 AM     Your access code will  in 90 days. If you need help or a new code, please contact your [facility] or call [phone] for assistance.        Care EveryWhere ID     This is your Care EveryWhere ID. This could be used by other organizations to access your [facility] medical records  [id number]        Equal Access to Services     DIEGO ARGUETA : Kisha Contreras, fariba coker, silvia guo, radhames bautista . So [facility] [phone].    ATENCIÓN: Si habla español, tiene a iverson disposición servicios gratuitos de asistencia lingüística. Llame al [phone].    We comply with applicable federal civil rights laws and [place] laws. We do not discriminate on the basis of race, color, national origin, age, disability, sex, sexual orientation, or gender identity.               Review of your medicines      CONTINUE these medicines which have NOT CHANGED        Dose / Directions    * cholecalciferol 1000 UNIT tablet   Commonly known as:  vitamin D3   Used for:  Vitamin D deficiency        Dose:  1000 Units   Take 1 tablet (1,000 Units) by mouth daily   Quantity:  100 tablet   Refills:  3       * cholecalciferol 25740 units capsule   Commonly known as:  VITAMIN D3   Used for:  Vitamin D deficiency        Dose:  1 capsule   Take 1 capsule (50,000 Units) by mouth once a week   Quantity:  8 capsule   Refills:  0       ciprofloxacin  500 MG tablet   Commonly known as:  CIPRO   Used for:  Dysuria        Dose:  500 mg   Take 1 tablet (500 mg) by mouth 2 times daily   Quantity:  6 tablet   Refills:  0       docusate sodium 100 MG capsule   Commonly known as:  COLACE   Used for:  Chronic constipation        Take 1-2 capsules daily as needed for constipation   Quantity:  60 capsule   Refills:  3       ferrous sulfate 325 (65 Fe) MG tablet   Commonly known as:  IRON   Used for:  Anemia, unspecified type        Dose:  325 mg   Take 1 tablet (325 mg) by mouth 2 times daily   Quantity:  60 tablet   Refills:  1       * Notice:  This list has 2 medication(s) that are the same as other medications prescribed for you. Read the directions carefully, and ask your doctor or other care provider to review them with you.      STOP taking     medroxyPROGESTERone 10 MG tablet   Commonly known as:  PROVERA                    Protect others around you: Learn how to safely use, store and throw away your medicines at www.disposemymeds.org.             Medication List: This is a list of all your medications and when to take them. Check marks below indicate your daily home schedule. Keep this list as a reference.      Medications           Morning Afternoon Evening Bedtime As Needed    * cholecalciferol 1000 UNIT tablet   Commonly known as:  vitamin D3   Take 1 tablet (1,000 Units) by mouth daily                                * cholecalciferol 80656 units capsule   Commonly known as:  VITAMIN D3   Take 1 capsule (50,000 Units) by mouth once a week                                ciprofloxacin 500 MG tablet   Commonly known as:  CIPRO   Take 1 tablet (500 mg) by mouth 2 times daily                                docusate sodium 100 MG capsule   Commonly known as:  COLACE   Take 1-2 capsules daily as needed for constipation                                ferrous sulfate 325 (65 Fe) MG tablet   Commonly known as:  IRON   Take 1 tablet (325 mg) by mouth 2 times daily                                 * Notice:  This list has 2 medication(s) that are the same as other medications prescribed for you. Read the directions carefully, and ask your doctor or other care provider to review them with you.

## 2018-05-02 NOTE — ANESTHESIA CARE TRANSFER NOTE
Patient: Mahogany Whitman    Procedure(s):  Dilation and Curettage Hysteroscopy, Possible Mocellation of Polyp, Mirena IUD Placement - Wound Class: II-Clean Contaminated   - Wound Class: I-Clean    Diagnosis: Uterine Hyperplasia, Menorrhagia  Diagnosis Additional Information: No value filed.    Anesthesia Type:   MAC     Note:  Airway :Room Air  Patient transferred to:Phase II  Comments: Uneventful transport to Phase 2; IV patent; Pt responds appropriately to command; Pt comfortable; VSS: Report to RNHandoff Report: Identifed the Patient, Identified the Reponsible Provider, Reviewed the pertinent medical history, Discussed the surgical course, Reviewed Intra-OP anesthesia mangement and issues during anesthesia, Set expectations for post-procedure period and Allowed opportunity for questions and acknowledgement of understanding      Vitals: (Last set prior to Anesthesia Care Transfer)    CRNA VITALS  5/2/2018 1226 - 5/2/2018 1301      5/2/2018             Resp Rate (set): 10                Electronically Signed By: JOSE D MUKHERJEE CRNA  May 2, 2018  1:01 PM

## 2018-05-02 NOTE — DISCHARGE INSTRUCTIONS
[facility]  Home Care Following Anesthesia  For 24 hours after surgery:  1. Get plenty of rest.  A responsible adult must stay with you for at least 24 hours after you leave the surgery center.  2. Do not drive or use heavy equipment.  If you have weakness or tingling, don't drive or use heavy equipment until this feeling goes away.   3. Do not drink alcohol.   4. Avoid strenuous or risky activities.  Ask for help when climbing stairs.  5. You may feel lightheaded.  IF so, sit for a few minutes before standing.  Have someone help you get up.   6. If you have nausea (feel sick to your stomach): Drink only clear liquids such as apple juice, ginger ale, broth or 7-Up.  Rest may also help.  Be sure to drink enough fluids.  Move to a regular diet as you feel able.   7. You may have a slight fever.  Call the doctor if your fever is over 100 F (37.7 C) (taken under the tongue) or lasts longer than 24 hours.  8. You may have a dry mouth, a sore throat, muscle aches or trouble sleeping. These should go away after 24 hours.  9. Do not make important or legal decisions.             Tips for taking pain medications  To get the best pain relief possible, remember these points:    Take pain medications as directed, before pain becomes severe.    Pain medication can upset your stomach: taking it with food may help.    Constipation is a common side effect of pain medication. Drink plenty of  fluids.    Eat foods high in fiber. Take a stool softener if recommended by your doctor or pharmacist.    Do not drink alcohol, drive or operate machinery while taking pain medications.    Ask about other ways to control pain, such as with heat, ice or relaxation.    Tylenol/Acetaminophen Consumption  To help encourage the safe use of acetaminophen, the makers of TYLENOL  have lowered the maximum daily dose for single-ingredient Extra Strength TYLENOL  (acetaminophen) products sold in the [place] from 8 pills  per day (4,000 mg) to 6 pills per day (3,000 mg). The dosing interval has also changed from 2 pills every 4-6 hours to 2 pills every 6 hours.    If you feel your pain relief is insufficient, you may take Tylenol/Acetaminophen in addition to your narcotic pain medication.     Be careful not to exceed 3,000 mg of Tylenol/Acetaminophen in a 24 hour period from all sources.    If you are taking extra strength Tylenol/acetaminophen (500 mg), the maximum dose is 6 tablets in 24 hours.    If you are taking regular strength acetaminophen (325 mg), the maximum dose is 9 tablets in 24 hours.    Call a doctor for any of the followin. Signs of infection (fever, growing tenderness at the surgery site, a large amount of drainage or bleeding, severe pain, foul-smelling drainage, redness, swelling).  2. It has been over 8 to 10 hours since surgery and you are still not able to urinate (pass water).  3. Headache for over 24 hours.    Your doctor is:  Dr. Dominique Gomez, Gynecologic Oncology: [phone]                  Or dial [phone] and ask for the resident on call for:  Gynecologic Oncology  For emergency care, call the:  [facility]:  [phone] (TTY for hearing impaired: [phone])

## 2018-05-02 NOTE — ANESTHESIA PREPROCEDURE EVALUATION
Anesthesia Evaluation     . Pt has had prior anesthetic. Type: General    No history of anesthetic complications          ROS/MED HX    ENT/Pulmonary:       Neurologic:  - neg neurologic ROS     Cardiovascular:  - neg cardiovascular ROS       METS/Exercise Tolerance:  4 - Raking leaves, gardening   Hematologic:  - neg hematologic  ROS       Musculoskeletal:  - neg musculoskeletal ROS       GI/Hepatic:     (+) hepatitis type B,       Renal/Genitourinary:  - ROS Renal section negative       Endo:  - neg endo ROS       Psychiatric:  - neg psychiatric ROS       Infectious Disease:  - neg infectious disease ROS       Malignancy:         Other:                     Physical Exam  Normal systems: dental    Airway   Mallampati: II  TM distance: >3 FB  Neck ROM: full    Dental     Cardiovascular   Rhythm and rate: regular and normal      Pulmonary    breath sounds clear to auscultation                    Anesthesia Plan      History & Physical Review  History and physical reviewed and following examination; no interval change.    ASA Status:  2 .    NPO Status:  > 6 hours    Plan for MAC with Intravenous induction. Maintenance will be TIVA.  Reason for MAC:  Deep or markedly invasive procedure (G8)  PONV prophylaxis:  Ondansetron (or other 5HT-3) and Dexamethasone or Solumedrol       Postoperative Care  Postoperative pain management:  Oral pain medications and Multi-modal analgesia.      Consents  Anesthetic plan, risks, benefits and alternatives discussed with:  Patient..                          .

## 2018-05-02 NOTE — BRIEF OP NOTE
[facility]  Brief Operative Note    Date of Surgery: 5/2/2018    Preop Dx:  Abnormal uterine bleeding     Atypical hyperplasia        Postop Dx:   same    Procedure:   EUA, D&C, operative hysteroscopy with Myosure, Mirena IUD insertion    Surgeon:   Dominique Gomez MD    Assistants:   Amy Schumer, MD PGY-1       Anesthesia:  MAC    EBL:  10 cc    IVF:  500 cc    UOP:  Not quantified    Drains: none   Fluid deficit: 400cc normal saline    Specimen:  Endometrial curettings, endocervical curettings,      Complications: None apparent    Findings: Normal appearing vaginal mucosa, mobile cervix, anteverted uterus, normal appearing endometrial lining, no obvious polyps or irregular lesions     Disposition:  Transferred in stable condition to the PACU    Mirena Lot# [id number]  Exp 09/19    Amy Schumer, MD   Obstetrics and Gynecology PGY-1  5/2/2018, 8:58 AM      Dominique Gomez MD    Department of Ob/Gyn and Women's Health  Division of Gynecologic Oncology  [facility]  [phone]    I was scrubbed and present for the entire procedure.

## 2018-05-02 NOTE — OP NOTE
[facility]  Operative Note      Preoperative diagnosis:  Abnormal uterine bleeding         Atypical hyperplasia  Postoperative diagnosis:  Same  Procedure:  EUA, operative hysteroscopy with Myosure, dilation and curettage, Mirena IUD insertion  Surgeon:  Dominique Gomez MD  Assistant: Amy Schumer, PGY-1 MD  Anesthesia:  MAC  Specimens: Endometrial curettings, endocervical curettings  Complications: none apparent    EBL:  10 mL  IVF:  500mL  UOP:  Not quantified  Fluid deficit:  400mL    Findings:  Exam under anesthesia revealed normal cervix, anteverted uterus, no adnexal masses. Hysteroscopy revealed normal appearing uterine cavity, normal tubal ostia visualized bilaterally. D&C performed with adequate return of tissue.    Indications:  Mahogany Whitman is a 47 year old  who has a history of abnormal uterine bleeding for the past year.  She underwent endometrial biopsy which showed atypical hyperplasia in 2017.  A repeat endometrial biopsy on 18 showed secretory endometrium but persistent endometrial thickening.  Risks, benefits, and alternatives to the procedure were discussed with the patient who elected to proceed.  All questions were answered and an informed consent was obtained.    Procedure:  The patient was taken to the operating room where she underwent MAC anesthesia without difficulty.  She was placed in a dorsal lithotomy position using Yellow fin stirrups.  The patient was examined for the above noted findings and then prepped and draped in the usual sterile fashion.  A medium graves speculum was inserted into the vagina. 1 cc 1% plain lidocaine was injected into the cervix at 12 o'clock position. A tenaculum was placed on the anterior cervical lip.  A paracervical block was administered with an additional 9cc 1% plain lidocaine at the 2, 5, 7 and 10 o'clock positions. The uterus sounded to 8 cm. The endocervical canal was serially dilated to 7 mm using Hegar  dilators.  The uterus sounded to 8 cm.  The hysteroscope was inserted without difficulty with the above findings noted.  The stylette was removed and the Myosure was introduced and endometrial sampling of thickened areas was performed without complication.  The hysteroscope and Myosure was removed and sharp curettage was performed.  Endocervical curettage was also performed.  The tissue was sent to pathology. All instruments were then removed.  A Mirena IUD was then inserted and deployed and strings cut to 2.5 cm.  The tenaculum was removed from the cervix and the puncture sites were hemostatic.  The patient was repositioned to the supine position.  The patient tolerated the procedure well and was taken to the recovery room in stable condition.  Dr. Gomez was scrubbed and present for the entire procedure.    Amy Schumer, MD  PGY-1 OB GYN  Pager: [phone]  5/2/2018,9:02 AM    Dominique Gomez MD    Department of Ob/Gyn and Women's Health  Division of Gynecologic Oncology  [facility]  [phone]    I was scrubbed and present for the entire procedure.

## 2018-05-03 LAB — COPATH REPORT: NORMAL

## 2018-05-07 ENCOUNTER — CARE COORDINATION (OUTPATIENT)
Dept: ONCOLOGY | Facility: CLINIC | Age: 48
End: 2018-05-07

## 2018-05-08 NOTE — PROGRESS NOTES
Surgery date: 5/2  Post op visit:  5/11    Spoke with pt states ok but having pain, does not feel it is from surgery  Pain   Abdominal pain   Meds:  none   Well controlled:  no  Denies fever, chills, bowel/bladder issues, leg redness/swelling/tenderness, chest pain, SOB  Eating and drinking well, denies nausea/vomiting  Incision    Healthy: no signs of infection,     Redness or drainage:  no   Shower:  yes  Staples:  NA  Walking:  yes  Questions:  none  Offered appt on 5/11 for evaluation of abdominal pain, pt could not come any other days sooner  Post op appt confirmed and patient will call if any questions or concerns

## 2018-05-11 ENCOUNTER — OFFICE VISIT (OUTPATIENT)
Dept: ONCOLOGY | Facility: CLINIC | Age: 48
End: 2018-05-11
Attending: NURSE PRACTITIONER
Payer: COMMERCIAL

## 2018-05-11 VITALS
DIASTOLIC BLOOD PRESSURE: 66 MMHG | HEIGHT: 64 IN | WEIGHT: 156 LBS | TEMPERATURE: 98.4 F | SYSTOLIC BLOOD PRESSURE: 106 MMHG | OXYGEN SATURATION: 100 % | HEART RATE: 73 BPM | RESPIRATION RATE: 16 BRPM | BODY MASS INDEX: 26.63 KG/M2

## 2018-05-11 DIAGNOSIS — N93.9 ABNORMAL VAGINAL BLEEDING: ICD-10-CM

## 2018-05-11 DIAGNOSIS — R10.31 RLQ ABDOMINAL PAIN: ICD-10-CM

## 2018-05-11 DIAGNOSIS — Z98.890 POST-OPERATIVE STATE: Primary | ICD-10-CM

## 2018-05-11 PROCEDURE — 99024 POSTOP FOLLOW-UP VISIT: CPT | Mod: ZP | Performed by: NURSE PRACTITIONER

## 2018-05-11 PROCEDURE — G0463 HOSPITAL OUTPT CLINIC VISIT: HCPCS | Mod: ZF

## 2018-05-11 ASSESSMENT — PAIN SCALES - GENERAL: PAINLEVEL: NO PAIN (0)

## 2018-05-11 NOTE — LETTER
2018       RE: Mahogany Whitman  [address] [address]     Dear Colleague,    Thank you for referring your patient, Mahogany Whitman, to the [facility]. Please see a copy of my visit note below.    Gynecologic Oncology Follow-Up Visit    RE: Mahogany Whitman  MRN: [id number]  : 1970  Date of Visit: 2018    CC: Mahogany Whitman is a 47 year old  female with a history of vaginal bleeding treated with a D&C and IUD placement on 18. She presents today for an acute visit regarding RLQ discomfort and a post-operative follow up.    HPI:  Mahogany comes to the clinic feeling fair. She had a longstanding history of RLQ aching that occurred during times of vaginal bleeding. This resolved post-operatively but has now returned. She is having scant amount of vaginal bleeding and is also having mild cramping in her lower back. She has not been using anything for pain control. Denies fevers, chills, generalized abdominal pain, passing clots vaginally, bowel issues, bladder issues, shortness of breath, chest pain, nausea, or vomiting. She is following post-operative restrictions.    Oncology History:  17: Presented for dysfunctional uterine bleeding. Endometrial biopsy positive for endometrial hyperplasia with atypia.  10/5/17: Saw Dr. Gomez, declined intervention for complex hyperplasia with atypia  18: Excessive vaginal bleeding, saw Dr. Gomez in clinic. Plan for D&C.  18: D&C, Mirena IUD placement. Path as follows:  FINAL DIAGNOSIS:   A. ENDOMETRIAL CURETTINGS:   - Proliferative-type endometrium   - Negative for hyperplasia or atypia     B. ENDOCERVICAL CURETTINGS:   - Blood and unremarkable lower uterine segment endometrium   - Rare endocervical mucosal cells         Past Medical History:   Diagnosis Date     Gestational diabetes      Past Surgical History:   Procedure Laterality Date     DILATION AND CURETTAGE, OPERATIVE  "HYSTEROSCOPY WITH MORCELLATOR, COMBINED N/A 5/2/2018    Procedure: COMBINED DILATION AND CURETTAGE, OPERATIVE HYSTEROSCOPY WITH MORCELLATOR;  Dilation and Curettage Hysteroscopy, Possible Mocellation of Polyp, Mirena IUD Placement;  Surgeon: Dominique Gomez MD;  Location: [facility] OR     INSERT INTRAUTERINE DEVICE N/A 5/2/2018    Procedure: INSERT INTRAUTERINE DEVICE;;  Surgeon: Dominique Gomez MD;  Location: [facility] OR     NO HISTORY OF SURGERY       Family History   Problem Relation Age of Onset     Asthma No family hx of      C.A.D. No family hx of      DIABETES No family hx of      Hypertension No family hx of      Breast Cancer No family hx of      Cancer - colorectal No family hx of      Social History     Social History     Marital status:      Spouse name: N/A     Number of children: N/A     Years of education: N/A     Social History Main Topics     Smoking status: Never Smoker     Smokeless tobacco: Never Used     Alcohol use No     Drug use: No     Sexual activity: Yes     Other Topics Concern     None     Social History Narrative    No tattoos or piercings. No blood transfusions.        Current Outpatient Prescriptions   Medication     ferrous sulfate (IRON) 325 (65 FE) MG tablet     cholecalciferol (VITAMIN D) 1000 UNIT tablet     cholecalciferol (VITAMIN D3) 94374 UNITS capsule     ciprofloxacin (CIPRO) 500 MG tablet     docusate sodium (COLACE) 100 MG capsule     No current facility-administered medications for this visit.      No Known Allergies    ROS  10 point ROS neg other than the symptoms noted above in the HPI.    Physical Exam:    /66  Pulse 73  Temp 98.4  F (36.9  C) (Oral)  Resp 16  Ht 1.626 m (5' 4\")  Wt 70.8 kg (156 lb)  LMP 04/24/2018  SpO2 100%  BMI 26.78 kg/m2    CONSTITUTIONAL: Alert non-toxic appearing female in no acute distress  HEAD: Normocephalic, atraumatic  EYES: PERRLA, no scleral icterus  ENT: Oropharynx pink without lesions   NECK: Neck supple without " lymphadenopathy or masses  RESPIRATORY: Lungs clear to auscultation, respiratory effort unlabored  CV: Regular rate and rhythm, S1S2, no clicks, murmurs, rubs, or gallops; bilateral lower extremities without edema, dorsalis pedis pulses 2+ bilaterally  GASTROINTESTINAL: Normoactive bowel sounds x4 quadrants, abdomen soft, non-distended, and non-tender to palpation without masses or organomegaly, no rebound tenderness, guarding, or rigidity.  GENITOURINARY: External genitalia and urethral meatus pink without lesions, masses, or excoriation. Vagina pink and moist without masses or lesions. Cervix without lesions, IUD strings well visualized. Scant amount dark blood in vaginal vault. Bimanual exam reveals no masses, fullness, or cervical motion tenderness.  LYMPHATIC: Cervical, supraclavicular, and inguinal lymph nodes without lymphadenopathy  NEUROLOGIC: Grossly intact, normal gait  MUSCULOSKELETAL: Moves all extremities, no obvious muscle wasting  SKIN: Appropriate color for race, warm and dry, no rashes or lesions to unclothed skin  PSYCHIATRIC: Pleasant and interactive, affect bright, makes appropriate eye contact, thought process linear      Data:  Pathology report:  FINAL DIAGNOSIS:   A. ENDOMETRIAL CURETTINGS:   - Proliferative-type endometrium   - Negative for hyperplasia or atypia     B. ENDOCERVICAL CURETTINGS:   - Blood and unremarkable lower uterine segment endometrium   - Rare endocervical mucosal cells     Assessment/Plan:  1.) Post-operative follow up: Doing well post-operatively with the exception of RLQ discomfort and cramping to her lower back. No s/sxs infection or uterine rupture. RLQ discomfort may be chronic pelvic pain but may also be due to recent procedure and IUD insertion, lower back cramping likely related to IUD which should resolve with time. Encouraged hot packs and ibuprofen 600mg q6h PRN (no more than 2400mg/24h). Discussed that if she has persistent RLQ pain she may be a candidate for  pelvic PT.  Reviewed pathology report which demonstrated a proliferative endometrium but was negative for hyperplasia or atypia- reinforced that her pathology report was negative for cancer and she was given a copy of this. To follow up with benign OB-GYN for further management and PCP for routine health care. To seek emergency care if she develops worsening pain, fevers, or bleeding saturating a pad an hour x2  2.) Patient verbalized understanding of and agreement with plan.      JOSE D Knapp, FNP-C  Division of Gynecologic Oncology  [facility]  Pager: [phone]

## 2018-05-11 NOTE — PROGRESS NOTES
Gynecologic Oncology Follow-Up Visit    RE: Mahogany Whitman  MRN: [id number]  : 1970  Date of Visit: 2018    CC: Mahogany Whitman is a 47 year old  female with a history of vaginal bleeding treated with a D&C and IUD placement on 18. She presents today for an acute visit regarding RLQ discomfort and a post-operative follow up.    HPI:  Mahogany comes to the clinic feeling fair. She had a longstanding history of RLQ aching that occurred during times of vaginal bleeding. This resolved post-operatively but has now returned. She is having scant amount of vaginal bleeding and is also having mild cramping in her lower back. She has not been using anything for pain control. Denies fevers, chills, generalized abdominal pain, passing clots vaginally, bowel issues, bladder issues, shortness of breath, chest pain, nausea, or vomiting. She is following post-operative restrictions.    Oncology History:  17: Presented for dysfunctional uterine bleeding. Endometrial biopsy positive for endometrial hyperplasia with atypia.  10/5/17: Saw Dr. Gomez, declined intervention for complex hyperplasia with atypia  18: Excessive vaginal bleeding, saw Dr. Gomez in clinic. Plan for D&C.  18: D&C, Mirena IUD placement. Path as follows:  FINAL DIAGNOSIS:   A. ENDOMETRIAL CURETTINGS:   - Proliferative-type endometrium   - Negative for hyperplasia or atypia     B. ENDOCERVICAL CURETTINGS:   - Blood and unremarkable lower uterine segment endometrium   - Rare endocervical mucosal cells         Past Medical History:   Diagnosis Date     Gestational diabetes      Past Surgical History:   Procedure Laterality Date     DILATION AND CURETTAGE, OPERATIVE HYSTEROSCOPY WITH MORCELLATOR, COMBINED N/A 2018    Procedure: COMBINED DILATION AND CURETTAGE, OPERATIVE HYSTEROSCOPY WITH MORCELLATOR;  Dilation and Curettage Hysteroscopy, Possible Mocellation of Polyp, Mirena IUD Placement;  Surgeon: Dominique Gomez MD;   "Location: [facility] OR     INSERT INTRAUTERINE DEVICE N/A 5/2/2018    Procedure: INSERT INTRAUTERINE DEVICE;;  Surgeon: Dominique Gomez MD;  Location: [facility] OR     NO HISTORY OF SURGERY       Family History   Problem Relation Age of Onset     Asthma No family hx of      C.A.D. No family hx of      DIABETES No family hx of      Hypertension No family hx of      Breast Cancer No family hx of      Cancer - colorectal No family hx of      Social History     Social History     Marital status:      Spouse name: N/A     Number of children: N/A     Years of education: N/A     Social History Main Topics     Smoking status: Never Smoker     Smokeless tobacco: Never Used     Alcohol use No     Drug use: No     Sexual activity: Yes     Other Topics Concern     None     Social History Narrative    No tattoos or piercings. No blood transfusions.        Current Outpatient Prescriptions   Medication     ferrous sulfate (IRON) 325 (65 FE) MG tablet     cholecalciferol (VITAMIN D) 1000 UNIT tablet     cholecalciferol (VITAMIN D3) 75913 UNITS capsule     ciprofloxacin (CIPRO) 500 MG tablet     docusate sodium (COLACE) 100 MG capsule     No current facility-administered medications for this visit.      No Known Allergies    ROS  10 point ROS neg other than the symptoms noted above in the HPI.    Physical Exam:    /66  Pulse 73  Temp 98.4  F (36.9  C) (Oral)  Resp 16  Ht 1.626 m (5' 4\")  Wt 70.8 kg (156 lb)  LMP 04/24/2018  SpO2 100%  BMI 26.78 kg/m2    CONSTITUTIONAL: Alert non-toxic appearing female in no acute distress  HEAD: Normocephalic, atraumatic  EYES: PERRLA, no scleral icterus  ENT: Oropharynx pink without lesions   NECK: Neck supple without lymphadenopathy or masses  RESPIRATORY: Lungs clear to auscultation, respiratory effort unlabored  CV: Regular rate and rhythm, S1S2, no clicks, murmurs, rubs, or gallops; bilateral lower extremities without edema, dorsalis pedis pulses 2+ bilaterally  GASTROINTESTINAL: " Normoactive bowel sounds x4 quadrants, abdomen soft, non-distended, and non-tender to palpation without masses or organomegaly, no rebound tenderness, guarding, or rigidity.  GENITOURINARY: External genitalia and urethral meatus pink without lesions, masses, or excoriation. Vagina pink and moist without masses or lesions. Cervix without lesions, IUD strings well visualized. Scant amount dark blood in vaginal vault. Bimanual exam reveals no masses, fullness, or cervical motion tenderness.  LYMPHATIC: Cervical, supraclavicular, and inguinal lymph nodes without lymphadenopathy  NEUROLOGIC: Grossly intact, normal gait  MUSCULOSKELETAL: Moves all extremities, no obvious muscle wasting  SKIN: Appropriate color for race, warm and dry, no rashes or lesions to unclothed skin  PSYCHIATRIC: Pleasant and interactive, affect bright, makes appropriate eye contact, thought process linear      Data:  Pathology report:  FINAL DIAGNOSIS:   A. ENDOMETRIAL CURETTINGS:   - Proliferative-type endometrium   - Negative for hyperplasia or atypia     B. ENDOCERVICAL CURETTINGS:   - Blood and unremarkable lower uterine segment endometrium   - Rare endocervical mucosal cells     Assessment/Plan:  1.) Post-operative follow up: Doing well post-operatively with the exception of RLQ discomfort and cramping to her lower back. No s/sxs infection or uterine rupture. RLQ discomfort may be chronic pelvic pain but may also be due to recent procedure and IUD insertion, lower back cramping likely related to IUD which should resolve with time. Encouraged hot packs and ibuprofen 600mg q6h PRN (no more than 2400mg/24h). Discussed that if she has persistent RLQ pain she may be a candidate for pelvic PT.  Reviewed pathology report which demonstrated a proliferative endometrium but was negative for hyperplasia or atypia- reinforced that her pathology report was negative for cancer and she was given a copy of this. To follow up with benign OB-GYN for further  management and PCP for routine health care. To seek emergency care if she develops worsening pain, fevers, or bleeding saturating a pad an hour x2  2.) Patient verbalized understanding of and agreement with plan.      JOSE D Knapp, FNP-C  Division of Gynecologic Oncology  [facility]  Pager: [phone]

## 2018-05-11 NOTE — MR AVS SNAPSHOT
"              After Visit Summary   2018    Mahogany Whitman    MRN: [id number]           Patient Information     Date Of Birth          1970        Visit Information        Provider Department      2018 4:00 PM Dinorah Edwards APRN CNP [facility]        Today's Diagnoses     Post-operative state    -  1    RLQ abdominal pain        Abnormal vaginal bleeding           Follow-ups after your visit        Who to contact     If you have questions or need follow up information about today's clinic visit or your schedule please contact [facility] directly at [phone].  Normal or non-critical lab and imaging results will be communicated to you by MyChart, letter or phone within 4 business days after the clinic has received the results. If you do not hear from us within 7 days, please contact the clinic through [employer] or phone. If you have a critical or abnormal lab result, we will notify you by phone as soon as possible.  Submit refill requests through [employer] or call your pharmacy and they will forward the refill request to us. Please allow 3 business days for your refill to be completed.          Additional Information About Your Visit        MyChart Information     [employer] lets you send messages to your doctor, view your test results, renew your prescriptions, schedule appointments and more. To sign up, go to [email or web address] . Click on \"Log in\" on the left side of the screen, which will take you to the Welcome page. Then click on \"Sign up Now\" on the right side of the page.     You will be asked to enter the access code listed below, as well as some personal information. Please follow the directions to create your username and password.     Your access code is: [email or web address]  Expires: 2018  6:31 AM     Your access code will  in 90 days. If you need help or a new code, please call your [place] clinic or [phone].        Care " "EveryWhere ID     This is your Care EveryWhere ID. This could be used by other organizations to access your [place] medical records  [id number]        Your Vitals Were     Pulse Temperature Respirations Height Last Period Pulse Oximetry    73 98.4  F (36.9  C) (Oral) 16 1.626 m (5' 4\") 04/24/2018 100%    BMI (Body Mass Index)                   26.78 kg/m2            Blood Pressure from Last 3 Encounters:   05/11/18 106/66   05/02/18 115/71   04/26/18 108/71    Weight from Last 3 Encounters:   05/11/18 70.8 kg (156 lb)   05/02/18 71.2 kg (157 lb)   04/26/18 71.3 kg (157 lb 3.2 oz)              Today, you had the following     No orders found for display       Primary Care Provider Office Phone # Fax #    Corinna Neely [phone]       [address] [address]        Equal Access to Services     [facility]: Hadii reena ku hadasho Soomaali, waaxda luqadaha, qaybta kaalmada adeegyada, radhames bautista . So [facility] [phone].    ATENCIÓN: Si habla español, tiene a iverson disposición servicios gratuitos de asistencia lingüística. [facility] [phone].    We comply with applicable federal civil rights laws and [place] laws. We do not discriminate on the basis of race, color, national origin, age, disability, sex, sexual orientation, or gender identity.            Thank you!     Thank you for choosing [facility]  for your care. Our goal is always to provide you with excellent care. Hearing back from our patients is one way we can continue to improve our services. Please take a few minutes to complete the written survey that you may receive in the mail after your visit with us. Thank you!             Your Updated Medication List - Protect others around you: Learn how to safely use, store and throw away your medicines at www.disposemymeds.org.          This list is accurate as of 5/11/18 11:59 PM.  Always use your most recent " med list.                   Brand Name Dispense Instructions for use Diagnosis    * cholecalciferol 1000 UNIT tablet    vitamin D3    100 tablet    Take 1 tablet (1,000 Units) by mouth daily    Vitamin D deficiency       * cholecalciferol 47622 units capsule    VITAMIN D3    8 capsule    Take 1 capsule (50,000 Units) by mouth once a week    Vitamin D deficiency       ciprofloxacin 500 MG tablet    CIPRO    6 tablet    Take 1 tablet (500 mg) by mouth 2 times daily    Dysuria       docusate sodium 100 MG capsule    COLACE    60 capsule    Take 1-2 capsules daily as needed for constipation    Chronic constipation       ferrous sulfate 325 (65 Fe) MG tablet    IRON    60 tablet    Take 1 tablet (325 mg) by mouth 2 times daily    Anemia, unspecified type       * Notice:  This list has 2 medication(s) that are the same as other medications prescribed for you. Read the directions carefully, and ask your doctor or other care provider to review them with you.

## 2018-05-11 NOTE — NURSING NOTE
"Oncology Rooming Note    May 11, 2018 3:36 PM   Mahogany Whitman is a 47 year old female who presents for:    Chief Complaint   Patient presents with     Oncology Clinic Visit     Return Post op     Initial Vitals: /66  Pulse 73  Temp 98.4  F (36.9  C) (Oral)  Resp 16  Ht 1.626 m (5' 4\")  Wt 70.8 kg (156 lb)  LMP 04/24/2018  SpO2 100%  BMI 26.78 kg/m2 Estimated body mass index is 26.78 kg/(m^2) as calculated from the following:    Height as of this encounter: 1.626 m (5' 4\").    Weight as of this encounter: 70.8 kg (156 lb). Body surface area is 1.79 meters squared.  No Pain (0) Comment: Data Unavailable   Patient's last menstrual period was 04/24/2018.  Allergies reviewed: Yes  Medications reviewed: Yes    Medications: Medication refills not needed today.  Pharmacy name entered into [facility]: [facility] [place], [place] - [address]    Clinical concerns: Post op     6 minutes for nursing intake (face to face time)     Soco Barcenas CMA              "

## 2018-10-29 DIAGNOSIS — E55.9 VITAMIN D DEFICIENCY: ICD-10-CM

## 2018-10-31 RX ORDER — METHOCARBAMOL 750 MG/1
TABLET ORAL
Qty: 8 CAPSULE | Refills: 0 | OUTPATIENT
Start: 2018-10-31

## 2019-03-29 NOTE — ANESTHESIA POSTPROCEDURE EVALUATION
Patient: Mahogany Whitman    Procedure(s):  Dilation and Curettage Hysteroscopy, Possible Mocellation of Polyp, Mirena IUD Placement - Wound Class: II-Clean Contaminated   - Wound Class: I-Clean    Diagnosis:Uterine Hyperplasia, Menorrhagia  Diagnosis Additional Information: No value filed.    Anesthesia Type:  MAC    Note:  Anesthesia Post Evaluation    Patient location during evaluation: bedside  Patient participation: Able to fully participate in evaluation  Level of consciousness: awake  Pain management: adequate  Airway patency: patent  Cardiovascular status: acceptable  Respiratory status: acceptable  Hydration status: acceptable  PONV: controlled     Anesthetic complications: None          Last vitals:  Vitals:    05/02/18 1021 05/02/18 1300 05/02/18 1330   BP: 110/79 111/74 116/67   Pulse: 78  63   Resp: 15 16 16   Temp: 36.7  C (98.1  F) 36.8  C (98.2  F)    SpO2: 100% 98% 98%         Electronically Signed By: Juventino Davies MD, MD  May 2, 2018  1:59 PM   Right:/shoulder

## 2020-01-22 ENCOUNTER — ANCILLARY PROCEDURE (OUTPATIENT)
Dept: GENERAL RADIOLOGY | Facility: CLINIC | Age: 50
End: 2020-01-22
Attending: FAMILY MEDICINE
Payer: COMMERCIAL

## 2020-01-22 ENCOUNTER — OFFICE VISIT (OUTPATIENT)
Dept: FAMILY MEDICINE | Facility: CLINIC | Age: 50
End: 2020-01-22
Payer: COMMERCIAL

## 2020-01-22 VITALS
RESPIRATION RATE: 18 BRPM | DIASTOLIC BLOOD PRESSURE: 72 MMHG | HEIGHT: 65 IN | WEIGHT: 161.6 LBS | HEART RATE: 72 BPM | BODY MASS INDEX: 26.92 KG/M2 | TEMPERATURE: 98.2 F | OXYGEN SATURATION: 98 % | SYSTOLIC BLOOD PRESSURE: 106 MMHG

## 2020-01-22 DIAGNOSIS — G89.29 CHRONIC RIGHT SHOULDER PAIN: Primary | ICD-10-CM

## 2020-01-22 DIAGNOSIS — G89.29 CHRONIC RIGHT SHOULDER PAIN: ICD-10-CM

## 2020-01-22 DIAGNOSIS — M25.511 CHRONIC RIGHT SHOULDER PAIN: ICD-10-CM

## 2020-01-22 DIAGNOSIS — M25.511 CHRONIC RIGHT SHOULDER PAIN: Primary | ICD-10-CM

## 2020-01-22 RX ORDER — NAPROXEN 500 MG/1
500 TABLET ORAL 2 TIMES DAILY WITH MEALS
Qty: 60 TABLET | Refills: 0 | Status: SHIPPED | OUTPATIENT
Start: 2020-01-22 | End: 2020-02-21

## 2020-01-22 SDOH — HEALTH STABILITY: MENTAL HEALTH: HOW OFTEN DO YOU HAVE A DRINK CONTAINING ALCOHOL?: NEVER

## 2020-01-22 ASSESSMENT — PAIN SCALES - GENERAL: PAINLEVEL: SEVERE PAIN (7)

## 2020-01-22 ASSESSMENT — MIFFLIN-ST. JEOR: SCORE: 1369.5

## 2020-01-22 NOTE — PROGRESS NOTES
Preceptor Attestation:   Patient seen, evaluated and discussed with the resident. I have verified the content of the note, which accurately reflects my assessment of the patient and the plan of care.   Supervising Physician:  Estefany Marcum MD

## 2020-01-22 NOTE — PATIENT INSTRUCTIONS
Here is the plan from today's visit    1. Right shoulder pain  Today we are getting an x-ray of your right shoulder, then you are going to see Dr. Collazo to follow-up and try to figure out what's going on. In the meantime, you can take naproxen two times a day (with food!) for your pain, when you need it.   - naproxen (NAPROSYN) 500 MG tablet; Take 1 tablet (500 mg) by mouth 2 times daily (with meals)  Dispense: 60 tablet; Refill: 0  - XR Shoulder Right 2 Views; Future  - Sports Medicine Clinic-[facility] INTERNAL REFERRAL      Please call or return to clinic if your symptoms don't go away.    Follow up plan  - See Dr. Collazo for R shoulder  - Make follow-up appointment with me (Dr. Sintia Cross) to discuss other joint pains    Thank you for coming to [facility] today.  Lab Testing:  **If you had lab testing today and your results are reassuring or normal they will be mailed to you or sent through [employer] within 7 days.   **If the lab tests need quick action we will call you with the results.  The phone number we will call with results is # [phone] (home) . If this is not the best number please call our clinic and change the number.  Medication Refills:  If you need any refills please call your pharmacy and they will contact us.   If you need to  your refill at a new pharmacy, please contact the new pharmacy directly. The new pharmacy will help you get your medications transferred faster.   Scheduling:  If you have any concerns about today's visit or wish to schedule another appointment please call our office during normal business hours [phone] (8-5:00 M-F)  If a referral was made to a [facility] and you don't get a call from central scheduling please call [phone].  If a Mammogram was ordered for you at [facility] call [phone] to schedule or change your appointment.  If you had an XRay/CT/Ultrasound/MRI ordered the number is [phone] to schedule  or change your radiology appointment.   Medical Concerns:  If you have urgent medical concerns please call [phone] at any time of the day.    Alexandra Patricio MD

## 2020-01-22 NOTE — PROGRESS NOTES
Mahogany is a 49 year old  who presents for   Patient presents with:  Chest Pain: Patient is complaining of chest and shoulder pain for x2weeks       Assessment and Plan      Mahogany was seen today for R shoulder pain pain.    Diagnoses and all orders for this visit:    Chronic right shoulder pain  Mahogany presents with subacute bilateral, R much worse than L, shoulder pain, exacerbated by recent trauma (fall down the stops, stopped herself on rail with R arm which pulled it and worsened pain). The R-sided clavicle protrusion was initially concerning for separation, but XR is reassuring. Deferring PT for now, especially given recent trauma and clavicle asymmetry. For now will plan to manage pain with naproxen (with food for gastric protection) and referral to sports medicine clinic for evaluation and treatment.   -     naproxen (NAPROSYN) 500 MG tablet; Take 1 tablet (500 mg) by mouth 2 times daily (with meals)  -     Cancel: XR Shoulder Right 2 Views; Future  -     [facility] INTERNAL REFERRAL  -     XR Shoulder Right 2 Views; Future         No follow-ups on file.    Options for treatment and follow-up care were reviewed with the patient. Mahogany Jeovanny J Carlos  engaged in the decision making process and verbalized understanding of the options discussed and agreed with the final plan.    Alexandra Patricio MD         HPI       Mahogany is a 49 year old  who presents for   Patient presents with:  Chest Pain: Patient is complaining of chest and shoulder pain for x2weeks       Visit [facility]    Joint/Muscle Pain: R shoulder      Onset: 3-4 months    Injury?  Yes Details: fall down the stairs one month ago, grabbed the railing and it pulled down on the shoulder    Description:   Location(s): R shoulder  Character: Sharp and Dull ache    Intensity: moderate    Progression of Symptoms: worse    Accompanying Signs & Symptoms:  Other symptoms: weakness of R shoulder, has to use other arm to help    History:   Previous  similar pain: Yes Details: similar pain, pain in the shoulder one year ago after visit to [facility]. Improved and then worsened 3-4 months ago.      Worsened by    Overuse?: Yes Details: weak, has to use other arm to help lift the shoulder.  Also worsened by sleeping  Morning Stiffness?: yes, whole body    Alleviating factors:  Improved by: nothing  Therapies Tried and outcome: massages, Details: did not help      Patient is a new patient to this clinic and so  I reviewed/updated the Past Medical History, the Family History and the Social History .  Patient Active Problem List   Diagnosis     Viral hepatitis B chronic (H)     Vitamin D deficiency     Dysfunctional uterine bleeding     Microcytic anemia     Chronic constipation     Abnormal vaginal bleeding     Past Surgical History:   Procedure Laterality Date     DILATION AND CURETTAGE, OPERATIVE HYSTEROSCOPY WITH MORCELLATOR, COMBINED N/A 5/2/2018    Procedure: COMBINED DILATION AND CURETTAGE, OPERATIVE HYSTEROSCOPY WITH MORCELLATOR;  Dilation and Curettage Hysteroscopy, Possible Mocellation of Polyp, Mirena IUD Placement;  Surgeon: Dominique Gomez MD;  [facility]: [facility] OR     INSERT INTRAUTERINE DEVICE N/A 5/2/2018    Procedure: INSERT INTRAUTERINE DEVICE;;  Surgeon: Dominique Gomez MD;  Location: [facility] OR     NO HISTORY OF SURGERY         Social History     Tobacco Use     Smoking status: Never Smoker     Smokeless tobacco: Never Used   Substance Use Topics     Alcohol use: Never     Frequency: Never     Family History   Problem Relation Age of Onset     Asthma No family hx of      C.A.D. No family hx of      Diabetes No family hx of      Hypertension No family hx of      Breast Cancer No family hx of      Cancer - colorectal No family hx of          Reviewed and updated as needed this visit by clinical staff  Tobacco  Allergies  Meds  Med Hx  Surg Hx  Fam Hx  Soc Hx      Reviewed and updated as needed this visit by Provider         Health Maintenance Due  "  Topic Date Due     PREVENTIVE CARE VISIT  1970     DTAP/TDAP/TD IMMUNIZATION (1 - Tdap) 10/21/1982     PNEUMOCOCCAL IMMUNIZATION 19-64 MEDIUM RISK (1 of 1 - PPSV23) 10/21/1990     LIPID  10/21/2016     INFLUENZA VACCINE (1) 09/01/2019     PHQ-2  01/01/2020            Review of Systems:   Review of Systems  All ROS was negative except those noted in HPI       Physical Exam:     Vitals:    01/22/20 1542   BP: 106/72   Pulse: 72   Resp: 18   Temp: 98.2  F (36.8  C)   TempSrc: Oral   SpO2: 98%   Weight: 73.3 kg (161 lb 9.6 oz)   Height: 1.66 m (5' 5.35\")     Body mass index is 26.6 kg/m .  Vitals were reviewed and were normal    Physical Exam   Right Shoulder Exam     Tenderness   The patient is experiencing tenderness in the acromioclavicular joint and biceps tendon.    Range of Motion   Active abduction: abnormal Right shoulder active abduction: decreased.   Passive abduction: normal   Extension: normal   External rotation: abnormal Right shoulder external rotation: decreased.   Forward flexion: normal     Muscle Strength   Abduction: 4/5   Internal rotation: 4/5   External rotation: 4/5   Supraspinatus: 4/5   Subscapularis: 4/5   Biceps: 4/5     Tests   Segovia test: negative  Drop arm: negative    Comments:  Clavicle protrudes in comparison to L.      Left Shoulder Exam     Muscle Strength   Abduction: 4/5   Internal rotation: 4/5   External rotation: 4/5   Supraspinatus: 4/5   Subscapularis: 4/5   Biceps: 4/5     Tests   Segovia test: negative  Drop arm: negative            Results:      Results from this visit  Results for orders placed or performed in visit on 01/22/20   XR Shoulder Right 2 Views     Status: None    Narrative    2 views right shoulder radiographs 1/23/2020 8:59 AM    History: Chronic right shoulder pain; Chronic right shoulder pain     Comparison: None    Findings:    AP, Grashey, transscapular Y, axillary  views of the right shoulder  were obtained.     No acute osseous abnormality. " Glenohumeral and acromioclavicular  joints are congruent.    Mild degenerative changes of the acromioclavicular joint. Mild  degenerative change of the glenohumeral joint.    Soft tissue is unremarkable. The visualized lung is clear.      Impression    Impression:  1. No acute osseous abnormality.  2. Mild glenohumeral degenerative change.    MD Alexandra GONZALEZ MD (Joe)  [facility]

## 2020-01-27 ENCOUNTER — OFFICE VISIT (OUTPATIENT)
Dept: FAMILY MEDICINE | Facility: CLINIC | Age: 50
End: 2020-01-27
Payer: COMMERCIAL

## 2020-01-27 VITALS
WEIGHT: 162.6 LBS | HEART RATE: 87 BPM | TEMPERATURE: 97.8 F | OXYGEN SATURATION: 100 % | BODY MASS INDEX: 27.09 KG/M2 | DIASTOLIC BLOOD PRESSURE: 73 MMHG | RESPIRATION RATE: 16 BRPM | HEIGHT: 65 IN | SYSTOLIC BLOOD PRESSURE: 109 MMHG

## 2020-01-27 DIAGNOSIS — M25.50 POLYARTHRALGIA: Primary | ICD-10-CM

## 2020-01-27 LAB
% GRANULOCYTES: 46.3 %G (ref 40–75)
CRP SERPL-MCNC: <2.9 MG/L (ref 0–8)
ERYTHROCYTE [SEDIMENTATION RATE] IN BLOOD: 11 MM/HR (ref 0–20)
GRANULOCYTES #: 1.9 K/UL (ref 1.6–8.3)
HCT VFR BLD AUTO: 43.3 % (ref 35–47)
HEMOGLOBIN: 13.2 G/DL (ref 11.7–15.7)
LYMPHOCYTES # BLD AUTO: 1.9 K/UL (ref 0.8–5.3)
LYMPHOCYTES NFR BLD AUTO: 44.1 %L (ref 20–48)
MCH RBC QN AUTO: 30.4 PG (ref 26.5–35)
MCHC RBC AUTO-ENTMCNC: 30.5 G/DL (ref 32–36)
MCV RBC AUTO: 99.7 FL (ref 78–100)
MID #: 0.4 K/UL (ref 0–2.2)
MID %: 9.6 %M (ref 0–20)
PLATELET # BLD AUTO: 200 K/UL (ref 150–450)
RBC # BLD AUTO: 4.34 M/UL (ref 3.8–5.2)
WBC # BLD AUTO: 4.2 K/UL (ref 4–11)

## 2020-01-27 ASSESSMENT — MIFFLIN-ST. JEOR: SCORE: 1374.04

## 2020-01-27 NOTE — PATIENT INSTRUCTIONS
Here is the plan from today's visit    1. Joint pain of multiple joints  Your joint pain is most likely related to osteoarthritis, which is related to wearing down of the joints as we age. We are doing some blood tests today to make sure that there is not something else causing the joint pain. I will be in touch with you about the results, either by phone or by mail (if normal).   - CBC with Diff Plt ([facility])  - Erythrocyte Sedimentation Rate ([facility])  - CRP inflammation      Please call or return to clinic if your symptoms don't go away.    Follow up plan  - follow-up with Dr. Sintia Cross on 2/11/2020    Thank you for coming to [facility] today.  Lab Testing:  **If you had lab testing today and your results are reassuring or normal they will be mailed to you or sent through [employer] within 7 days.   **If the lab tests need quick action we will call you with the results.  The phone number we will call with results is # [phone] (home) . If this is not the best number please call our clinic and change the number.  Medication Refills:  If you need any refills please call your pharmacy and they will contact us.   If you need to  your refill at a new pharmacy, please contact the new pharmacy directly. The new pharmacy will help you get your medications transferred faster.   Scheduling:  If you have any concerns about today's visit or wish to schedule another appointment please call our office during normal business hours [phone] (8-5:00 M-F)  If a referral was made to a [facility] and you don't get a call from central scheduling please call [phone].  If a Mammogram was ordered for you at [facility] call [phone] to schedule or change your appointment.  If you had an XRay/CT/Ultrasound/MRI ordered the number is [phone] to schedule or change your radiology appointment.   Medical Concerns:  If you have urgent medical concerns please call [phone] at  any time of the day.    Alexandra Patricio MD

## 2020-01-27 NOTE — PROGRESS NOTES
Mahogany is a 49 year old  who presents for   Patient presents with:  Follow Up: bilateral shoulder pain, per pt right shoulder is worse than left and she did try the medication without much relief      Assessment and Plan      Mahogany was seen today for follow up.    Diagnoses and all orders for this visit:    Polyarthralgia  Mahogany presents with chronic pain of the hands and knees bilaterally. Differential includes osteoarthritis vs inflammatory arthritis. OA most likely given exam without obvious joint warmth or swelling, no MCP involvement or ulnar deviation in the hands and history consistent with OA of the hands. CBC and CRP both WNL, also consistent with non-inflammatory process (sed rate pending). Will plan to discuss strategies for managing pain associated with OA at follow-up visit scheduled on 2/11/20.   -     CBC with Diff Plt ([facility])  -     Erythrocyte Sedimentation Rate ([facility])  -     CRP inflammation    Other orders  -     TDAP VACCINE ([facility])         No follow-ups on file.    Options for treatment and follow-up care were reviewed with the patient. Mahogany Jeovanny Whitman  engaged in the decision making process and verbalized understanding of the options discussed and agreed with the final plan.    Alexandra Patricio MD         HPI       Mahogany is a 49 year old  who presents for   Patient presents with:  Follow Up: bilateral shoulder pain, per pt right shoulder is worse than left and she did try the medication without much relief      Visit [facility]  1. Joint pain, multiple joints: Mahogany reports joint pain in the wrists, hands, fingers and knees bilaterally for several years. She says that she does not have the pain every day, but at least 4 days a week experiences stiffness and achiness in the hands and knees. It is worst in the morning (for the hands especially) and improves with movement throughout the day.    She denies any trauma. She reports occasional swelling of the knees, no warmth or swelling of  the hands or fingers. She has had some weight fluctuation, no fevers or chills. No temperature intolerance.    Patient is an established patient of this clinic..  Patient Active Problem List   Diagnosis     Viral hepatitis B chronic (H)     Vitamin D deficiency     Dysfunctional uterine bleeding     Microcytic anemia     Chronic constipation     Abnormal vaginal bleeding     Past Surgical History:   Procedure Laterality Date     DILATION AND CURETTAGE, OPERATIVE HYSTEROSCOPY WITH MORCELLATOR, COMBINED N/A 5/2/2018    Procedure: COMBINED DILATION AND CURETTAGE, OPERATIVE HYSTEROSCOPY WITH MORCELLATOR;  Dilation and Curettage Hysteroscopy, Possible Mocellation of Polyp, Mirena IUD Placement;  Surgeon: Dominique Gomez MD;  [facility]     INSERT INTRAUTERINE DEVICE N/A 5/2/2018    Procedure: INSERT INTRAUTERINE DEVICE;;  Surgeon: Dominique Gomez MD;  [facility] OR     NO HISTORY OF SURGERY         Social History     Tobacco Use     Smoking status: Never Smoker     Smokeless tobacco: Never Used   Substance Use Topics     Alcohol use: Never     Frequency: Never     Family History   Problem Relation Age of Onset     Asthma No family hx of      C.A.D. No family hx of      Diabetes No family hx of      Hypertension No family hx of      Breast Cancer No family hx of      Cancer - colorectal No family hx of          Reviewed and updated as needed this visit by clinical staff  Tobacco  Allergies  Meds       Reviewed and updated as needed this visit by Provider       Health Maintenance Due   Topic Date Due     PREVENTIVE CARE VISIT  1970     DTAP/TDAP/TD IMMUNIZATION (1 - Tdap) 10/21/1982     PNEUMOCOCCAL IMMUNIZATION 19-64 MEDIUM RISK (1 of 1 - PPSV23) 10/21/1990     LIPID  10/21/2016     INFLUENZA VACCINE (1) 09/01/2019            Review of Systems:   Review of Systems  All ROS was negative except those noted in HPI       Physical Exam:     Vitals:    01/27/20 1440   BP: 109/73   BP Location: Left arm  "  Patient Position: Sitting   Cuff Size: Adult Regular   Pulse: 87   Resp: 16   Temp: 97.8  F (36.6  C)   TempSrc: Oral   SpO2: 100%   Weight: 73.8 kg (162 lb 9.6 oz)   Height: 1.66 m (5' 5.35\")     Body mass index is 26.77 kg/m .  Vitals were reviewed and were normal    Physical Exam   GEN: Alert, oriented person in NAD  HENT: normocephalic, atraumatic  NECK: full ROM  RESPIRATORY: no respiratory distress, no extra WOB, speaking in full sentences  CVS: regular rate, WWP  MSK: Hands: no ulnar deviation. No tenderness to palpation of joints of fingers, hands or wrists, no bogginess or warmth. Full strength and ROM. Knees: No effusions, no joint-line tenderness to palpation. Full strength in flexion and extension, normal ROM. no  peripheral edema. Normal gait.  NEURO: no FND.     Results:      Results from this visit  Results for orders placed or performed in visit on 01/27/20   CBC with Diff Plt ([facility])     Status: Abnormal   Result Value Ref Range    WBC 4.2 4.0 - 11.0 K/uL    Lymphocytes # 1.9 0.8 - 5.3 K/uL    % Lymphocytes 44.1 20.0 - 48.0 %L    Mid # 0.4 0.0 - 2.2 K/uL    Mid % 9.6 0.0 - 20.0 %M    GRANULOCYTES # 1.9 1.6 - 8.3 K/uL    % Granulocytes 46.3 40.0 - 75.0 %G    RBC 4.34 3.80 - 5.20 M/uL    Hemoglobin 13.2 11.7 - 15.7 g/dL    Hematocrit 43.3 35.0 - 47.0 %    MCV 99.7 78.0 - 100.0 fL    MCH 30.4 26.5 - 35.0 pg    MCHC 30.5 (L) 32.0 - 36.0 g/dL    Platelets 200.0 150.0 - 450.0 K/uL   Erythrocyte Sedimentation Rate ([facility])     Status: None   Result Value Ref Range    Sed Rate 11 0 - 20 mm/hr       MD [facility]       .  "

## 2020-01-27 NOTE — LETTER
January 31, 2020      Mahogany Up J Carlos  [address] [address]        Dear Mahogany,    Thank you for getting your care at [facility]. Your results do not show any sign of increased inflammation in your body, which means that most likely your pain is caused by wearing down of the joints over time (osteoarthritis). We will discuss further at our next visit.     Resulted Orders   CBC with Diff Plt ([facility])   Result Value Ref Range    WBC 4.2 4.0 - 11.0 K/uL    Lymphocytes # 1.9 0.8 - 5.3 K/uL    % Lymphocytes 44.1 20.0 - 48.0 %L    Mid # 0.4 0.0 - 2.2 K/uL    Mid % 9.6 0.0 - 20.0 %M    GRANULOCYTES # 1.9 1.6 - 8.3 K/uL    % Granulocytes 46.3 40.0 - 75.0 %G    RBC 4.34 3.80 - 5.20 M/uL    Hemoglobin 13.2 11.7 - 15.7 g/dL    Hematocrit 43.3 35.0 - 47.0 %    MCV 99.7 78.0 - 100.0 fL    MCH 30.4 26.5 - 35.0 pg    MCHC 30.5 (L) 32.0 - 36.0 g/dL    Platelets 200.0 150.0 - 450.0 K/uL   Erythrocyte Sedimentation Rate ([facility])   Result Value Ref Range    Sed Rate 11 0 - 20 mm/hr   CRP inflammation   Result Value Ref Range    CRP Inflammation <2.9 0.0 - 8.0 mg/L       If you have any concerns about these results please call and leave a message for me or send a [employer] message to the clinic.    Sincerely,    Alexandra Patricio MD

## 2020-02-03 NOTE — NURSING NOTE
.Pre Op Nurse Teaching Template    Relevant Diagnosis: abnormal uterine bleeding    Teaching Topic: d and c hysteroscopy mirena iud placement     Person(s) involved in teaching :  Patient    Motivation Level:  Asks Questions:    Yes      Eager to Learn:     Yes     Cooperative:          Yes    Receptive (willing. Able to accept information):    Yes      Patient and those who are listed above demonstrates understanding of the following:   Reason for the appointment, diagnosis and treatment plan:   Yes   Knowledge of proper use of medications and conditions for which they are ordered (with special attention to potential side effects or drug interactions): Yes   Which situations necessitate calling provider and whom to contact: Yes         Nutritional needs and diet plan:  Yes      Pain management techniques:     Yes, Pain Scale   Diet:   Yes, [facility] Diet Instructions    Teaching Concerns addressed: Yes    Infection Prevention:  Patient and those who are listed above demonstrate understanding of the following:  Pre-Op CHG Bathing Instructions: Yes  Surgical procedure site care taught:   Yes   Signs and symptoms of infection taught: Yes       Instructional Materials Used/Given:  [facility] [place] Before You Surgery Booklet  Showering or Bathing before Surgery Instructions & CHG Product  Hysterectomy Guidelines  Pain Assessment Tool   Home Care after Major Abdominal or Vaginal Surgery  Map  Accommodations Brochure  Phone numbers for [facility] and [facility]  Copy of Surgical Consent    Done Today:    Preop Visit not needed   Tests Ordered:  Post Op Visit Scheduled:tbd  Comments:    Surgery date/time:5/2    Consent to file in medical records  .     PAST SURGICAL HISTORY:  Bladder cancer mass removal x2    History of tonsillectomy

## 2020-02-04 ENCOUNTER — OFFICE VISIT (OUTPATIENT)
Dept: FAMILY MEDICINE | Facility: CLINIC | Age: 50
End: 2020-02-04
Payer: COMMERCIAL

## 2020-02-04 VITALS
BODY MASS INDEX: 26.33 KG/M2 | HEIGHT: 66 IN | SYSTOLIC BLOOD PRESSURE: 92 MMHG | DIASTOLIC BLOOD PRESSURE: 64 MMHG | RESPIRATION RATE: 16 BRPM | TEMPERATURE: 97.9 F | OXYGEN SATURATION: 98 % | WEIGHT: 163.8 LBS | HEART RATE: 79 BPM

## 2020-02-04 DIAGNOSIS — M19.012 PRIMARY OSTEOARTHRITIS OF BOTH SHOULDERS: Primary | ICD-10-CM

## 2020-02-04 DIAGNOSIS — D50.9 MICROCYTIC ANEMIA: ICD-10-CM

## 2020-02-04 DIAGNOSIS — E55.9 VITAMIN D INSUFFICIENCY: ICD-10-CM

## 2020-02-04 DIAGNOSIS — S46.002D INJURY OF LEFT ROTATOR CUFF, SUBSEQUENT ENCOUNTER: ICD-10-CM

## 2020-02-04 DIAGNOSIS — M19.011 PRIMARY OSTEOARTHRITIS OF BOTH SHOULDERS: Primary | ICD-10-CM

## 2020-02-04 ASSESSMENT — MIFFLIN-ST. JEOR: SCORE: 1389.74

## 2020-02-04 NOTE — PATIENT INSTRUCTIONS
- Take naproxen 500mg one to three times per day as needed. Okay to take at night with glass of milk.  - Referred to PT for bilateral shoulder work for suspected rotator cuff dysfunction. PT will call you to schedule appointments. Probably just a couple appointments.  - Try some group exercise classes at [facility], such as Chanel, Yoga, or Step class (or others that look fun)! Try going to gym at least once or twice DURING week plus on the weekends.  - If no improvement in 4 weeks with physical therapy, ice, naproxen, and increased mobility, can consider steroid injections vs advanced imaging  - Continue vitamin D

## 2020-02-05 LAB — DEPRECATED CALCIDIOL+CALCIFEROL SERPL-MC: 24 UG/L (ref 20–75)

## 2020-02-07 ENCOUNTER — TELEPHONE (OUTPATIENT)
Dept: ORTHOPEDICS | Facility: CLINIC | Age: 50
End: 2020-02-07

## 2020-02-07 NOTE — TELEPHONE ENCOUNTER
Vit D level is 24 which is low normal    Vit D 5000 international unit(s) daily.  Please have patient take daily.    Sonya Collzao MD

## 2020-02-10 PROBLEM — M19.011 PRIMARY OSTEOARTHRITIS OF BOTH SHOULDERS: Status: ACTIVE | Noted: 2020-02-10

## 2020-02-10 PROBLEM — M19.012 PRIMARY OSTEOARTHRITIS OF BOTH SHOULDERS: Status: ACTIVE | Noted: 2020-02-10

## 2020-02-10 RX ORDER — CHOLECALCIFEROL (VITAMIN D3) 50 MCG
1 TABLET ORAL DAILY
COMMUNITY
Start: 2020-02-10

## 2020-02-26 ENCOUNTER — THERAPY VISIT (OUTPATIENT)
Dept: PHYSICAL THERAPY | Facility: CLINIC | Age: 50
End: 2020-02-26
Payer: COMMERCIAL

## 2020-02-26 DIAGNOSIS — M25.512 CHRONIC LEFT SHOULDER PAIN: ICD-10-CM

## 2020-02-26 DIAGNOSIS — M19.011 PRIMARY OSTEOARTHRITIS OF BOTH SHOULDERS: ICD-10-CM

## 2020-02-26 DIAGNOSIS — M19.012 PRIMARY OSTEOARTHRITIS OF BOTH SHOULDERS: ICD-10-CM

## 2020-02-26 DIAGNOSIS — G89.29 CHRONIC LEFT SHOULDER PAIN: ICD-10-CM

## 2020-02-26 PROCEDURE — 97161 PT EVAL LOW COMPLEX 20 MIN: CPT | Mod: GP | Performed by: PHYSICAL THERAPIST

## 2020-02-26 PROCEDURE — 97112 NEUROMUSCULAR REEDUCATION: CPT | Mod: GP | Performed by: PHYSICAL THERAPIST

## 2020-02-26 NOTE — PROGRESS NOTES
[facility] Initial Evaluation  Subjective:  The history is provided by the patient. No  was used.   Patient Entered Health History - See Therapist Evaluation below  Mahogany Whitman being seen for shoulder.     Date of Onset: 2/4/20, date of order.   Problem occurred: not sure  and reported as 3/10 on pain scale.  General health as reported by patient is good.  Pertinent medical history includes: none.   Red flags:  None as reported by patient.     Surgeries include:  None.    Current medications:  None.    Current occupation is Teacher.                     Therapist Generated HPI Evaluation  Problem details: The pt reports her bilateral pain started in September. She is having a difficult time lifting, and sleeping on her sides. The pt reports that both of her shoulders are causing her equal discomfort. She does stretches daily. The pt is right handed..         Type of problem:  Bilateral shoulders.    This is a chronic condition.  Condition occurred with:  Unknown cause.  Where condition occurred: for unknown reasons.  Patient reports pain:  Anterior, lateral and scapular area (upper chest).  Pain is described as shooting and sharp (tingling) and is constant.  Pain radiates to:  Shoulder, upper arm and lower arm. Pain is worse during the day.  Since onset symptoms are gradually worsening.  Associated symptoms:  Tingling and painful arc. Symptoms are exacerbated by carrying, lifting, using arm at shoulder level, using arm overhead, using arm behind back and lying on extremity  and relieved by activity/movement and NSAID's.  Special tests included:  X-ray.    Restrictions due to condition include:  Working in normal job without restrictions.  Barriers include:  None as reported by patient.    Physical Exam                    Objective:  Standing Alignment:    Cervical/Thoracic:  Forward head  Shoulder/UE:  Humeral head anterior L and humeral head anterior  R                  Flexibility/Screens:     Upper Extremity:    Decreased left upper extremity flexibility at:  Pectoralis Major and Pectoralis Minor    Decreased right upper extremity flexibility present at:  Pectoralis Major and Pectoralis Minor    Spine:  Decreased left spine flexibility:  Upper Trap    Decreased right spine flexibility:  Upper Trap                  Cervical/Thoracic Evaluation  Arom wnl cervical: WNL, pain at end range SB and rotation to R side.         Headaches: none  Cervical Myotomes:  Cervical myotomes: negative spurlings B.                                       Shoulder Evaluation:  ROM:  AROM:    Flexion:  Left:  130 pain    Right:  130 pain    Abduction:  Left: 160, pain    Right:  160 pain                            Strength:    Flexion: Left:4/5    Pain: +    Right: 4/5      Pain:  +    Abduction:  Left: 4/5   Pain:+    Right: 4/5      Pain:+    Internal Rotation:  Right: 3/5   Weak/pain free    Pain:  External Rotation:   Left:3/5   Weak/pain free    Pain:   Right:3/5   Weak/pain free    Pain:    Horizontal Abduction:  Left:3/5   Weak/pain free    Pain:              Special Tests:    Left shoulder positive for the following special tests:  Labral; Impingement and Bursal  Right shoulder positive for the following special tests:Labral; Impingement; Bursal and Rotator cuff tear  Palpation:    Left shoulder tenderness present at:  Biceps; Supraspinatus; Infraspinatus; Upper Trap and Bicipital Groove  Right shoulder tenderness present at: Biceps; Supraspinatus; Infraspinatus; Upper Trap and Bicipital Groove  Mobility Tests:      Glenohumeral posterior left:  Hypomobile  Glenohumeral posterior right:  Hypomobile                                             General     ROS    Assessment/Plan:    Patient is a 49 year old female with both sides shoulder complaints.    Patient has the following significant findings with corresponding treatment plan.                Diagnosis 1:  Chronic pain B  shoulders  Pain -  hot/cold therapy, US, electric stimulation, mechanical traction, manual therapy, STS, splint/taping/bracing/orthotics, self management and education  Decreased ROM/flexibility - manual therapy, therapeutic exercise, therapeutic activity and home program  Decreased joint mobility - manual therapy, therapeutic exercise, therapeutic activity and home program  Decreased strength - therapeutic exercise, therapeutic activities and home program  Impaired muscle performance - electric stimulation, neuro re-education and home program  Impaired posture - neuro re-education, therapeutic activities and home program    Therapy Evaluation Codes:     Cumulative Therapy Evaluation is: Low complexity.    Previous and current functional limitations:  (See Goal Flow Sheet for this information)    Short term and Long term goals: (See Goal Flow Sheet for this information)     Communication ability:  Patient appears to be able to clearly communicate and understand verbal and written communication and follow directions correctly.  Treatment Explanation - The following has been discussed with the patient:   RX ordered/plan of care  Anticipated outcomes  Possible risks and side effects  This patient would benefit from PT intervention to resume normal activities.   Rehab potential is good.    Frequency:  1 X week, once daily  Duration:  for 8 weeks  Discharge Plan:  Achieve all LTG.  Independent in home treatment program.  Reach maximal therapeutic benefit.    Please refer to the daily flowsheet for treatment today, total treatment time and time spent performing 1:1 timed codes.

## 2020-03-04 ENCOUNTER — THERAPY VISIT (OUTPATIENT)
Dept: PHYSICAL THERAPY | Facility: CLINIC | Age: 50
End: 2020-03-04
Payer: COMMERCIAL

## 2020-03-04 DIAGNOSIS — M25.512 CHRONIC LEFT SHOULDER PAIN: ICD-10-CM

## 2020-03-04 DIAGNOSIS — G89.29 CHRONIC LEFT SHOULDER PAIN: ICD-10-CM

## 2020-03-04 PROCEDURE — 97140 MANUAL THERAPY 1/> REGIONS: CPT | Mod: GP | Performed by: PHYSICAL THERAPIST

## 2020-03-04 PROCEDURE — 97110 THERAPEUTIC EXERCISES: CPT | Mod: GP | Performed by: PHYSICAL THERAPIST

## 2020-03-04 PROCEDURE — 97112 NEUROMUSCULAR REEDUCATION: CPT | Mod: GP | Performed by: PHYSICAL THERAPIST

## 2020-04-14 PROBLEM — M25.512 CHRONIC LEFT SHOULDER PAIN: Status: RESOLVED | Noted: 2020-02-26 | Resolved: 2020-04-14

## 2020-04-14 PROBLEM — G89.29 CHRONIC LEFT SHOULDER PAIN: Status: RESOLVED | Noted: 2020-02-26 | Resolved: 2020-04-14

## 2020-04-14 NOTE — PROGRESS NOTES
Discharge Note    Progress reporting period is from initial evaluation date (please see noted date below) to Mar 4, 2020.  Linked Episodes   Type: Episode: Status: Noted: Resolved: Last update: Updated by:   PHYSICAL THERAPY B shoulder pain Active 2/26/2020  3/4/2020  3:08 PM Sameera Schultz, PT      Comments:       Deqa failed to follow up and current status is unknown.  Please see information below for last relevant information on current status.  Patient seen for 2 visits.    SUBJECTIVE  Subjective changes noted by patient:  The pt reports that she is feeling better. she is able to horizontally adduct without pain in her left arm.  The pt went to the [place] 3 days in a row w  .  Current pain level is 3/10.     Previous pain level was  3/10.   Changes in function:  Yes (See Goal flowsheet attached for changes in current functional level)  Adverse reaction to treatment or activity: None    OBJECTIVE  Changes noted in objective findings: AROM shoulders: L: flexion 150 degrees, abduction 150 degrees, R: flexion: 160 degrees, abduction 160 degrees, pain at end range of motion on L side     ASSESSMENT/PLAN  Diagnosis: chronic B shoulder pain   Updated problem list and treatment plan:   Pain - HEP  Decreased ROM/flexibility - HEP  Decreased strength - HEP  Impaired muscle performance - HEP  STG/LTGs have been met or progress has been made towards goals:  Yes, please see goal flowsheet for most current information  Assessment of Progress: current status is unknown.    Last current status: Pt is progressing as expected   Self Management Plans:  HEP  I have re-evaluated this patient and find that the nature, scope, duration and intensity of the therapy is appropriate for the medical condition of the patient.  Deqa continues to require the following intervention to meet STG and LTG's:  HEP.    Recommendations:  Discharge with current home program.  Patient to follow up with MD as needed.    Please refer to the daily  flowsheet for treatment today, total treatment time and time spent performing 1:1 timed codes.

## 2020-08-28 PROBLEM — R93.89 ENDOMETRIAL STRIPE INCREASED: Status: ACTIVE | Noted: 2020-08-28

## 2020-08-28 PROBLEM — M17.0 BILATERAL PRIMARY OSTEOARTHRITIS OF KNEE: Status: ACTIVE | Noted: 2018-11-06

## 2023-04-14 ENCOUNTER — TRANSCRIBE ORDERS (OUTPATIENT)
Dept: OTHER | Age: 53
End: 2023-04-14

## 2023-04-14 DIAGNOSIS — B18.1 CHRONIC HEPATITIS B (H): Primary | ICD-10-CM

## 2023-04-14 DIAGNOSIS — R74.01 TRANSAMINITIS: ICD-10-CM

## 2023-04-27 NOTE — TELEPHONE ENCOUNTER
DIAGNOSIS: Hep B   Appt Date: 05.23.2023   NOTES STATUS DETAILS   OFFICE NOTE from referring provider     OFFICE NOTES from other specialists Care Everywhere 04.12.2023 Fiona Peralta MD [facility]   DISCHARGE SUMMARY from hospital     MEDICATION LIST Internal / CE    LIVER BIOSPY (IF APPLICABLE)      PATHOLOGY REPORTS      IMAGING     ENDOSCOPY (IF AVAILABLE)     COLONOSCOPY (IF AVAILABLE)     ULTRASOUND LIVER Care Everywhere - in pr 04.25.2023 US Abd RUQ   CT OF ABDOMEN     MRI OF LIVER     FIBROSCAN, US ELASTOGRAPHY, FIBROSIS SCAN, MR ELASTOGRAPHY     LABS     HEPATIC PANEL (LIVER PANEL) Care Everywhere 03.17.2023   BASIC METABOLIC PANEL Care Everywhere 03.17.2023   COMPLETE METABOLIC PANEL Care Everywhere 03.17.2023   COMPLETE BLOOD COUNT (CBC) Care Everywhere 03.17.2023   INTERNATIONAL NORMALIZED RATIO (INR)     HEPATITIS C ANTIBODY     HEPATITIS C VIRAL LOAD/PCR     HEPATITIS C GENOTYPE     HEPATITIS B SURFACE ANTIGEN Care Everywhere 03.17.2023   HEPATITIS B SURFACE ANTIBODY Care Everywhere 03.17.2023   HEPATITIS B DNA QUANT LEVEL     HEPATITIS B CORE ANTIBODY       Action 04.27.2023 RM   Action Taken Called Bhupinder to get image pushed called [phone] pending image.

## 2023-05-16 ENCOUNTER — TELEPHONE (OUTPATIENT)
Dept: GASTROENTEROLOGY | Facility: CLINIC | Age: 53
End: 2023-05-16
Payer: COMMERCIAL

## 2023-05-16 NOTE — TELEPHONE ENCOUNTER
Left vm through [facility]  reminding of lab and appointment with Dr. Robin on 5/23/2023. Scheduling number left if any questions of needs to reschedule.    Mónica Matthews, [facility]  5/16/2023 3:28 PM

## 2023-05-19 ENCOUNTER — DOCUMENTATION ONLY (OUTPATIENT)
Dept: LAB | Facility: CLINIC | Age: 53
End: 2023-05-19
Payer: COMMERCIAL

## 2023-05-19 DIAGNOSIS — B18.1 VIRAL HEPATITIS B CHRONIC (H): Primary | ICD-10-CM

## 2023-05-19 NOTE — CONFIDENTIAL NOTE
DIAGNOSIS: Hep B   Appt Date: 05.23.2023   NOTES STATUS DETAILS   OFFICE NOTE from referring provider       OFFICE NOTES from other specialists Care Everywhere 04.12.2023 Fiona Peralta MD [facility]   DISCHARGE SUMMARY from hospital       MEDICATION LIST Internal / CE     LIVER BIOSPY (IF APPLICABLE)        PATHOLOGY REPORTS        IMAGING       ENDOSCOPY (IF AVAILABLE)       COLONOSCOPY (IF AVAILABLE)       ULTRASOUND LIVER Care Everywhere 04.25.2023 US Abd RUQ   CT OF ABDOMEN       MRI OF LIVER       FIBROSCAN, US ELASTOGRAPHY, FIBROSIS SCAN, MR ELASTOGRAPHY       LABS       HEPATIC PANEL (LIVER PANEL) Care Everywhere 03.17.2023   BASIC METABOLIC PANEL Care Everywhere 03.17.2023   COMPLETE METABOLIC PANEL Care Everywhere 03.17.2023   COMPLETE BLOOD COUNT (CBC) Care Everywhere 03.17.2023   INTERNATIONAL NORMALIZED RATIO (INR)       HEPATITIS C ANTIBODY       HEPATITIS C VIRAL LOAD/PCR       HEPATITIS C GENOTYPE       HEPATITIS B SURFACE ANTIGEN Care Everywhere 03.17.2023   HEPATITIS B SURFACE ANTIBODY Care Everywhere 03.17.2023   HEPATITIS B DNA QUANT LEVEL       HEPATITIS B CORE ANTIBODY          Action 04.27.2023 RM   Action Taken Called Bhupinder to get image pushed called [phone] pending image.     Action 05.10.2022 RM   Action Taken Image received and uploaded to chart

## 2023-05-23 ENCOUNTER — HOSPITAL ENCOUNTER (OUTPATIENT)
Facility: AMBULATORY SURGERY CENTER | Age: 53
End: 2023-05-23
Attending: INTERNAL MEDICINE
Payer: COMMERCIAL

## 2023-05-23 ENCOUNTER — PRE VISIT (OUTPATIENT)
Dept: GASTROENTEROLOGY | Facility: CLINIC | Age: 53
End: 2023-05-23

## 2023-05-23 ENCOUNTER — TELEPHONE (OUTPATIENT)
Dept: GASTROENTEROLOGY | Facility: CLINIC | Age: 53
End: 2023-05-23

## 2023-05-23 ENCOUNTER — LAB (OUTPATIENT)
Dept: LAB | Facility: CLINIC | Age: 53
End: 2023-05-23
Attending: INTERNAL MEDICINE
Payer: COMMERCIAL

## 2023-05-23 ENCOUNTER — OFFICE VISIT (OUTPATIENT)
Dept: GASTROENTEROLOGY | Facility: CLINIC | Age: 53
End: 2023-05-23
Attending: INTERNAL MEDICINE
Payer: COMMERCIAL

## 2023-05-23 VITALS
HEIGHT: 66 IN | RESPIRATION RATE: 16 BRPM | DIASTOLIC BLOOD PRESSURE: 70 MMHG | HEART RATE: 69 BPM | OXYGEN SATURATION: 100 % | WEIGHT: 154.1 LBS | BODY MASS INDEX: 24.77 KG/M2 | TEMPERATURE: 97.8 F | SYSTOLIC BLOOD PRESSURE: 98 MMHG

## 2023-05-23 DIAGNOSIS — B18.1 CHRONIC HEPATITIS B (H): ICD-10-CM

## 2023-05-23 DIAGNOSIS — Z12.11 ENCOUNTER FOR SCREENING COLONOSCOPY: Primary | ICD-10-CM

## 2023-05-23 DIAGNOSIS — B18.1 VIRAL HEPATITIS B CHRONIC (H): ICD-10-CM

## 2023-05-23 DIAGNOSIS — R74.01 TRANSAMINITIS: ICD-10-CM

## 2023-05-23 LAB
ALBUMIN SERPL BCG-MCNC: 4.1 G/DL (ref 3.5–5.2)
ALP SERPL-CCNC: 63 U/L (ref 35–104)
ALT SERPL W P-5'-P-CCNC: 55 U/L (ref 10–35)
ANION GAP SERPL CALCULATED.3IONS-SCNC: 8 MMOL/L (ref 7–15)
AST SERPL W P-5'-P-CCNC: 34 U/L (ref 10–35)
BILIRUB DIRECT SERPL-MCNC: <0.2 MG/DL (ref 0–0.3)
BILIRUB SERPL-MCNC: 0.3 MG/DL
BUN SERPL-MCNC: 12 MG/DL (ref 6–20)
CALCIUM SERPL-MCNC: 9.4 MG/DL (ref 8.6–10)
CHLORIDE SERPL-SCNC: 101 MMOL/L (ref 98–107)
CREAT SERPL-MCNC: 0.45 MG/DL (ref 0.51–0.95)
DEPRECATED HCO3 PLAS-SCNC: 28 MMOL/L (ref 22–29)
ERYTHROCYTE [DISTWIDTH] IN BLOOD BY AUTOMATED COUNT: 13.3 % (ref 10–15)
GFR SERPL CREATININE-BSD FRML MDRD: >90 ML/MIN/1.73M2
GLUCOSE SERPL-MCNC: 115 MG/DL (ref 70–99)
HCT VFR BLD AUTO: 39.8 % (ref 35–47)
HGB BLD-MCNC: 13.7 G/DL (ref 11.7–15.7)
INR PPP: 1.07 (ref 0.85–1.15)
MCH RBC QN AUTO: 32.1 PG (ref 26.5–33)
MCHC RBC AUTO-ENTMCNC: 34.4 G/DL (ref 31.5–36.5)
MCV RBC AUTO: 93 FL (ref 78–100)
PLATELET # BLD AUTO: 155 10E3/UL (ref 150–450)
POTASSIUM SERPL-SCNC: 4.8 MMOL/L (ref 3.4–5.3)
PROT SERPL-MCNC: 7.3 G/DL (ref 6.4–8.3)
RBC # BLD AUTO: 4.27 10E6/UL (ref 3.8–5.2)
SODIUM SERPL-SCNC: 137 MMOL/L (ref 136–145)
WBC # BLD AUTO: 3.2 10E3/UL (ref 4–11)

## 2023-05-23 PROCEDURE — 82248 BILIRUBIN DIRECT: CPT | Performed by: PATHOLOGY

## 2023-05-23 PROCEDURE — 85610 PROTHROMBIN TIME: CPT | Performed by: PATHOLOGY

## 2023-05-23 PROCEDURE — 99000 SPECIMEN HANDLING OFFICE-LAB: CPT | Performed by: PATHOLOGY

## 2023-05-23 PROCEDURE — G0463 HOSPITAL OUTPT CLINIC VISIT: HCPCS | Performed by: INTERNAL MEDICINE

## 2023-05-23 PROCEDURE — 36415 COLL VENOUS BLD VENIPUNCTURE: CPT | Performed by: PATHOLOGY

## 2023-05-23 PROCEDURE — 80053 COMPREHEN METABOLIC PANEL: CPT | Performed by: PATHOLOGY

## 2023-05-23 PROCEDURE — 85027 COMPLETE CBC AUTOMATED: CPT | Performed by: PATHOLOGY

## 2023-05-23 PROCEDURE — 99204 OFFICE O/P NEW MOD 45 MIN: CPT | Performed by: INTERNAL MEDICINE

## 2023-05-23 PROCEDURE — 87517 HEPATITIS B DNA QUANT: CPT | Mod: 90 | Performed by: PATHOLOGY

## 2023-05-23 ASSESSMENT — PAIN SCALES - GENERAL: PAINLEVEL: NO PAIN (0)

## 2023-05-23 NOTE — NURSING NOTE
"Chief Complaint   Patient presents with     Consult     Hep B     Vital signs:  Temp: 97.8  F (36.6  C) Temp src: Oral BP: 98/70 Pulse: 69   Resp: 16 SpO2: 100 %     Height: 167.6 cm (5' 5.98\") Weight: 69.9 kg (154 lb 1.6 oz)  Estimated body mass index is 24.88 kg/m  as calculated from the following:    Height as of this encounter: 1.676 m (5' 5.98\").    Weight as of this encounter: 69.9 kg (154 lb 1.6 oz).      Mónica Matthews, [facility]  5/23/2023 7:56 AM      "

## 2023-05-23 NOTE — TELEPHONE ENCOUNTER
Screening Questions  BLUE  KIND OF PREP RED  LOCATION [review exclusion criteria] GREEN  SEDATION TYPE        n Are you active on mychart?       AMIRA FLORES Ordering/Referring Provider?        [employer] What type of coverage do you have?      n Have you had a positive covid test in the last 14 days?     26.4 1. BMI  [BMI 40+ - review exclusion criteria& smart-phrase document]    y  2. Are you able to give consent for your medical care? [IF NO,RN REVIEW]          n  3. Are you taking any prescription pain medications on a routine schedule   (ex narcotics: oxycodone, roxicodone, oxycontin,  and percocet)? [RN Review]        n  3a. EXTENDED PREP What kind of prescription?     n 4. Do you have any chemical dependencies such as alcohol, street drugs, or methadone?        **If yes 3- 5 , please schedule with MAC sedation.**          IF YES TO ANY 6 - 10 - HOSPITAL SETTING ONLY.     n 6.   Do you need assistance transferring?     n 7.   Have you had a heart or lung transplant?    n 8.   Are you currently on dialysis?   n 9.   Do you use daily home oxygen?   n 10. Do you take nitroglycerin?   10a. n If yes, how often?     n 11. Are you currently pregnant?    11a. n If yes, how many weeks? [ Greater than 12 weeks, OR NEEDED]    n 12. Do you have Pulmonary Hypertension? *NEED PAC APPT AT [facility] w/ [facility]*     n 13. [review exclusion criteria]  Do you have any implantable devices in your body (pacemaker, defib, LVAD)?    n 14. In the past 6 months, have you had any heart related issues including cardiomyopathy or heart attack?     14a. n If yes, did it require cardiac stenting if so when?     n 15. Have you had a stroke or Transient ischemic attack (TIA - aka  mini stroke ) within 6 months?      n 16. Do you have mod to severe Obstructive Sleep Apnea?  [Hospital only]    n 17. Do you have SEVERE AND UNCONTROLLED asthma? *NEED PAC APPT AT [facility] w/[facility]*     18.Do you take blood thinners?  No    n 19. Do you take any  "of the following medications?    Phentermine    Ozempic    Wegovy (Semaglutide)      19a. If yes, \"Hold for 7 days before procedure.  Please consult your prescribing provider if you have questions about holding this medication.\"     n  20. Do you have chronic kidney disease?      n  21. Do you have a diagnosis of diabetes?     y  22. On a regular basis do you go 3-5 days between bowel movements?      23. Preferred LOCAL Pharmacy for Pre Prescription      [facility] - [place], [place] - [address]        - CLOSING REMINDERS -    You will receive a call from a Nurse to review instructions and health history.  This assessment must be completed prior to your procedure.  Failure to complete the Nurse assessment may result in the procedure being cancelled.      On the day of your procedure, please designatean adult(s) who can drive you home stay with you for the next 24 hours. The medicines used in the exam will make you sleepy. You will not be able to drive.      You cannot take public transportation, ride share services, or non-medical taxi service without a responsible caregiver.  Medical transport services are allowed with the requirement that a responsible caregiver will receive you at your destination.  We require that drivers and caregivers are confirmed prior to your procedure.      - SCHEDULING DETAILS -  n &  Hospital Setting Required & If yes, what is the exclusion?   Viskopriyanka  Surgeon    9/12  Date of Procedure  Lower Endoscopy [Colonoscopy]  Type of Procedure Scheduled  [facility]   GOLYTELY EXTENDED - If you answer yes to questions #1, #3, #22 (De [place] and CF pts)Which Colonoscopy Prep was Sent?     CS Sedation Type     n PAC / Pre-op Required                 "

## 2023-05-23 NOTE — LETTER
5/23/2023         RE: Mahogany Whitman  [address] [facility] [place] [id number]        Dear Colleague,    Thank you for referring your patient, Mahogany Whitman, to the [facility] [place]. Please see a copy of my visit note below.    [facility] Hepatology    New Patient Visit    Referring provider:  Fiona Peralta    Chief complaint: Hepatitis B viral infection.     SUBJECTIVE:  Ms. Whitman is a 52-year-old [place] immigrant who was diagnosed with hepatitis B when she came here to the [place] in 1992.  She was seen here by Luisa Gonsalez on 01/09/2015, at which time she was deemed to be in the inactive phase of the disease.  She had at that time just labs and ultrasound, and she was not started on treatment at that time and since.      She also has no signs of chronic liver disease like jaundice, fluid retention, i.e., edema.  She does not have any signs of hepatic encephalopathy or gastrointestinal bleed.  She has, also, no significant history of abdominal pain, nausea or vomiting.  She is moving her bowels every day or every other day, but according to her, she strains.  She never had a colonoscopy and is reluctant to have one now. She also denies any fevers, night sweats or chills.  She has a stable weight.  She did get diagnosed in 2019 with COVID and has done the vaccination x3 doses so far.    Medical hx Surgical hx   Past Medical History:   Diagnosis Date    Gestational diabetes       Past Surgical History:   Procedure Laterality Date    DILATION AND CURETTAGE, OPERATIVE HYSTEROSCOPY WITH MORCELLATOR, COMBINED N/A 5/2/2018    Procedure: COMBINED DILATION AND CURETTAGE, OPERATIVE HYSTEROSCOPY WITH MORCELLATOR;  Dilation and Curettage Hysteroscopy, Possible Mocellation of Polyp, Mirena IUD Placement;  Surgeon: Dominique Gomez MD;  Location: [facility] OR    INSERT INTRAUTERINE DEVICE N/A 5/2/2018    Procedure: INSERT INTRAUTERINE DEVICE;;  Surgeon:  "Dominique Gomez MD;  Location: [facility] OR    NO HISTORY OF SURGERY            Medications  Current Outpatient Medications   Medication Sig Dispense Refill    vitamin D3 (CHOLECALCIFEROL) 2000 units (50 mcg) tablet Take 1 tablet (2,000 Units) by mouth daily      docusate sodium (COLACE) 100 MG capsule Take 1-2 capsules daily as needed for constipation (Patient not taking: Reported on 5/11/2018) 60 capsule 3       Allergies  No Known Allergies    Family hx Social hx   Family History   Problem Relation Age of Onset    Asthma No family hx of     C.A.D. No family hx of     Diabetes No family hx of     Hypertension No family hx of     Breast Cancer No family hx of     Cancer - colorectal No family hx of       Social History     Tobacco Use    Smoking status: Never    Smokeless tobacco: Never   Substance Use Topics    Alcohol use: Never    Drug use: Never            REVIEW OF SYSTEMS:  Ms. Whitman denies any recent infections.  She has no fatigue, no headaches, no seizures.  Denies any cough, shortness of breath or chest pain.  She has no significant anemia now. She has no easy bruising.  She is not known to be diabetic or have thyroid issues.  She complains of degenerative joint disease, especially her knees.  She denies any psychiatric diagnosis.  Does not have any eye or hearing problems.  She does not have any skin issues, either.  Otherwise, a comprehensive review of systems was noncontributory.    Examination  BP 98/70 (BP Location: Right arm, Patient Position: Sitting, Cuff Size: Adult Regular)   Pulse 69   Temp 97.8  F (36.6  C) (Oral)   Resp 16   Ht 1.676 m (5' 5.98\")   Wt 69.9 kg (154 lb 1.6 oz)   SpO2 100%   BMI 24.88 kg/m    Body mass index is 24.88 kg/m .    Gen- well, NAD, A+Ox3, normal color  Eye- EOMI  ENT- MMM, normal oropharynx  Lym- no palpable lymphadenopathy  CVS- S1, S2 normal, no added sounds, RRR  RS- CTA  Abd- Not distended.   Extr- pulses good, no MICHELLE  MS- hands normal- no clubbing  Neuro- " A+Ox3, no asterixis  Skin- no rash or jaundice  Psych- normal mood    Laboratory  Lab Results   Component Value Date     05/23/2023    .8 09/29/2016    POTASSIUM 4.8 05/23/2023    POTASSIUM 3.7 09/29/2016    CHLORIDE 101 05/23/2023    CHLORIDE 98.1 09/29/2016    CO2 28 05/23/2023    CO2 27.4 09/29/2016    BUN 12.0 05/23/2023    BUN 8.6 09/29/2016    CR 0.45 05/23/2023    CR 0.4 09/29/2016       Lab Results   Component Value Date    BILITOTAL 0.3 05/23/2023    BILITOTAL <0.4 09/29/2016    ALT 55 05/23/2023    ALT 14.5 09/29/2016    AST 34 05/23/2023    AST 22.6 09/29/2016    ALKPHOS 63 05/23/2023    ALKPHOS 58.5 09/29/2016       Lab Results   Component Value Date    ALBUMIN 4.1 05/23/2023    ALBUMIN 3.7 04/01/2016    PROTTOTAL 7.3 05/23/2023    PROTTOTAL 7.7 09/29/2016        Lab Results   Component Value Date    WBC 3.2 05/23/2023    WBC 3.9 04/26/2018    HGB 13.7 05/23/2023    HGB 13.2 01/27/2020    MCV 93 05/23/2023    MCV 99.7 01/27/2020     05/23/2023     04/26/2018       Lab Results   Component Value Date    INR 1.07 05/23/2023    INR 1.00 01/09/2015         Radiology    ASSESSMENT AND PLAN:  Ms. Whitman carries a diagnosis of hepatitis B.  She has been very noncompliant with visits and labs.  She had in the past when she did her workup inactive disease.  She had no signs of chronic liver disease then and now.      The plan will be to get a full workup again, including viral markers, HBV DNA and abdominal ultrasound.  She is also 52 years old.  She will need to get a colonoscopy, but she is reluctant.  She plans to go to [place] soon.  We looked at her labs.  Her liver function tests were all normal, but the last one, ALT was 50.  We await her HBV DNA, and if that is compatible with active disease, we might start her on treatment before she leaves for [place].     Ms. Whitman will follow with her primary care physician regarding other healthcare maintenance issues, and she will be seen  here in 6 months.    I spent 45 minutes on this encounter of 05/23/2023 in chart reviewing, history taking, physical examination and documentation.  I spent some of the time in coordination of care and counseling, also.    Song Robin MD  Hepatology  [facility]

## 2023-05-23 NOTE — PROGRESS NOTES
[facility] Hepatology    New Patient Visit    Referring provider:  Fiona Peralta    Chief complaint: Hepatitis B viral infection.     SUBJECTIVE:  Ms. Whitman is a 52-year-old [place] immigrant who was diagnosed with hepatitis B when she came here to the [place] in 1992.  She was seen here by Luisa Gonsalez on 01/09/2015, at which time she was deemed to be in the inactive phase of the disease.  She had at that time just labs and ultrasound, and she was not started on treatment at that time and since.      She also has no signs of chronic liver disease like jaundice, fluid retention, i.e., edema.  She does not have any signs of hepatic encephalopathy or gastrointestinal bleed.  She has, also, no significant history of abdominal pain, nausea or vomiting.  She is moving her bowels every day or every other day, but according to her, she strains.  She never had a colonoscopy and is reluctant to have one now. She also denies any fevers, night sweats or chills.  She has a stable weight.  She did get diagnosed in 2019 with COVID and has done the vaccination x3 doses so far.    Medical hx Surgical hx   Past Medical History:   Diagnosis Date     Gestational diabetes       Past Surgical History:   Procedure Laterality Date     DILATION AND CURETTAGE, OPERATIVE HYSTEROSCOPY WITH MORCELLATOR, COMBINED N/A 5/2/2018    Procedure: COMBINED DILATION AND CURETTAGE, OPERATIVE HYSTEROSCOPY WITH MORCELLATOR;  Dilation and Curettage Hysteroscopy, Possible Mocellation of Polyp, Mirena IUD Placement;  Surgeon: Dominique Gomez MD;  [facility]     INSERT INTRAUTERINE DEVICE N/A 5/2/2018    Procedure: INSERT INTRAUTERINE DEVICE;;  Surgeon: Dominique Gomez MD;  Location: [facility] OR     NO HISTORY OF SURGERY            Medications  Current Outpatient Medications   Medication Sig Dispense Refill     vitamin D3 (CHOLECALCIFEROL) 2000 units (50 mcg) tablet Take 1 tablet (2,000 Units) by mouth daily       docusate sodium  "(COLACE) 100 MG capsule Take 1-2 capsules daily as needed for constipation (Patient not taking: Reported on 5/11/2018) 60 capsule 3       Allergies  No Known Allergies    Family hx Social hx   Family History   Problem Relation Age of Onset     Asthma No family hx of      C.A.D. No family hx of      Diabetes No family hx of      Hypertension No family hx of      Breast Cancer No family hx of      Cancer - colorectal No family hx of       Social History     Tobacco Use     Smoking status: Never     Smokeless tobacco: Never   Substance Use Topics     Alcohol use: Never     Drug use: Never            REVIEW OF SYSTEMS:  Ms. Whitman denies any recent infections.  She has no fatigue, no headaches, no seizures.  Denies any cough, shortness of breath or chest pain.  She has no significant anemia now. She has no easy bruising.  She is not known to be diabetic or have thyroid issues.  She complains of degenerative joint disease, especially her knees.  She denies any psychiatric diagnosis.  Does not have any eye or hearing problems.  She does not have any skin issues, either.  Otherwise, a comprehensive review of systems was noncontributory.    Examination  BP 98/70 (BP Location: Right arm, Patient Position: Sitting, Cuff Size: Adult Regular)   Pulse 69   Temp 97.8  F (36.6  C) (Oral)   Resp 16   Ht 1.676 m (5' 5.98\")   Wt 69.9 kg (154 lb 1.6 oz)   SpO2 100%   BMI 24.88 kg/m    Body mass index is 24.88 kg/m .    Gen- well, NAD, A+Ox3, normal color  Eye- EOMI  ENT- MMM, normal oropharynx  Lym- no palpable lymphadenopathy  CVS- S1, S2 normal, no added sounds, RRR  RS- CTA  Abd- Not distended.   Extr- pulses good, no MICHELLE  MS- hands normal- no clubbing  Neuro- A+Ox3, no asterixis  Skin- no rash or jaundice  Psych- normal mood    Laboratory  Lab Results   Component Value Date     05/23/2023    .8 09/29/2016    POTASSIUM 4.8 05/23/2023    POTASSIUM 3.7 09/29/2016    CHLORIDE 101 05/23/2023    CHLORIDE 98.1 " 09/29/2016    CO2 28 05/23/2023    CO2 27.4 09/29/2016    BUN 12.0 05/23/2023    BUN 8.6 09/29/2016    CR 0.45 05/23/2023    CR 0.4 09/29/2016       Lab Results   Component Value Date    BILITOTAL 0.3 05/23/2023    BILITOTAL <0.4 09/29/2016    ALT 55 05/23/2023    ALT 14.5 09/29/2016    AST 34 05/23/2023    AST 22.6 09/29/2016    ALKPHOS 63 05/23/2023    ALKPHOS 58.5 09/29/2016       Lab Results   Component Value Date    ALBUMIN 4.1 05/23/2023    ALBUMIN 3.7 04/01/2016    PROTTOTAL 7.3 05/23/2023    PROTTOTAL 7.7 09/29/2016        Lab Results   Component Value Date    WBC 3.2 05/23/2023    WBC 3.9 04/26/2018    HGB 13.7 05/23/2023    HGB 13.2 01/27/2020    MCV 93 05/23/2023    MCV 99.7 01/27/2020     05/23/2023     04/26/2018       Lab Results   Component Value Date    INR 1.07 05/23/2023    INR 1.00 01/09/2015         Radiology    ASSESSMENT AND PLAN:  Ms. Whitman carries a diagnosis of hepatitis B.  She has been very noncompliant with visits and labs.  She had in the past when she did her workup inactive disease.  She had no signs of chronic liver disease then and now.      The plan will be to get a full workup again, including viral markers, HBV DNA and abdominal ultrasound.  She is also 52 years old.  She will need to get a colonoscopy, but she is reluctant.  She plans to go to [place] soon.  We looked at her labs.  Her liver function tests were all normal, but the last one, ALT was 50.  We await her HBV DNA, and if that is compatible with active disease, we might start her on treatment before she leaves for [place].     Ms. Whitman will follow with her primary care physician regarding other healthcare maintenance issues, and she will be seen here in 6 months.    I spent 45 minutes on this encounter of 05/23/2023 in chart reviewing, history taking, physical examination and documentation.  I spent some of the time in coordination of care and counseling, also.    Song Robin MD  Hepatology  [facility]

## 2023-05-25 LAB
HBV DNA SERPL NAA+PROBE-ACNC: ABNORMAL IU/ML
HBV DNA SERPL NAA+PROBE-LOG IU: 5.2 {LOG_IU}/ML

## 2023-05-30 ENCOUNTER — TELEPHONE (OUTPATIENT)
Dept: GASTROENTEROLOGY | Facility: CLINIC | Age: 53
End: 2023-05-30
Payer: COMMERCIAL

## 2023-05-30 NOTE — TELEPHONE ENCOUNTER
Left voicemail message for patient to return writer's call to discuss lab results and need for Hepatitis B treatment.    Cady JAVIER LPN  Hepatology Clinic

## 2023-08-25 ENCOUNTER — TELEPHONE (OUTPATIENT)
Dept: GASTROENTEROLOGY | Facility: CLINIC | Age: 53
End: 2023-08-25

## 2023-08-25 DIAGNOSIS — Z12.11 ENCOUNTER FOR SCREENING COLONOSCOPY: Primary | ICD-10-CM

## 2023-08-25 RX ORDER — BISACODYL 5 MG/1
TABLET, DELAYED RELEASE ORAL
Qty: 4 TABLET | Refills: 0 | Status: SHIPPED | OUTPATIENT
Start: 2023-08-25 | End: 2023-11-17

## 2023-08-25 NOTE — LETTER
August 25, 2023      Mahogany Up J Carlos  [address] [facility] [address]              Dear Mahogany,        Colonoscopy      Procedure date: 9/12/23    Anticipated arrival time: 0745 AM   (Procedure Times are Subject to Change)    [facility]; [address] [place] [address], 5th Floor, [place] [address] - Check in location: 5th Floor. Parking information: Self pay parking is available in West surface lot directly across from the  main entrance of the [facility]. Entry and exit to this lot is on [facility]. In  addition, self pay parking is also available in [employer] ([address] [facility]).  We have dedicated patient only spots on 1st floor of [address] ramp.  services are available for patients with limited mobility. Services available Monday-Friday, 7:00a.m.-  5:00p.m.      Important Procedure Reminders:     Prep Instructions:   Instructions on how to prepare for your upcoming procedure are found below. Please read instructions carefully. Deviation from instructions may result in less than desired outcomes and procedure may need to be rescheduled. If you have additional questions regarding how to prepare for your upcoming procedure, please contact our endoscopy pre assessment nurses at [phone] option 4.      Policy:   On the day of your procedure, please designatean adult(s) who can drive you home stay with you for the next 24 hours. The medicines used in the exam will make you sleepy. You will not be able to drive. You cannot take public transportation, ride share services, or non-medical taxi service without a responsible caregiver.  Medical transport services are allowed with the requirement that a responsible caregiver will receive you at your destination.  We require that drivers and caregivers are confirmed prior to your procedure.    Day of procedure:  Please do not wear jewelry (i.e. earrings, rings, necklaces, watches, etc) . Leave your purse,  billfold, credit cards, and other valuables at home.   Bring insurance card and ID.     To cancel or reschedule your procedure:   Please call our endoscopy scheduling team at: [facility] Endoscopy: [phone], option 2. Monday through Friday, 7:00am-5:00pm.      Medication Reminders:    Please note the following medication holding recommendations:   N/A    ----------------------------------------------------------------------------------------------------------------------------------------------------------------------------------------------------------------------------------------------------------------------    Golytely Extended Colonoscopy Prep  Prep instructions for colonoscopy   Pre-Assessment Phone Number: [facility]; [phone] option 4      Bowel prep has been sent to [facility] [place], [place] - [address]      Please read these instructions carefully at least 7 days prior to your colonoscopy procedure. Be sure to follow all directions completely. The inside of your colon must be clean to allow for a complete examination for the presence of any growths, polyps, and/or abnormalities, as well as their biopsy or removal. A number of tips are included in order to make this part of the procedure as comfortable as possible.    Immediately:  You will receive a call from a Nurse to review instructions and health history.  This assessment must be completed prior to your procedure.  Failure to complete the Nurse assessment may result in the procedure being cancelled.  You must arrange for an adult to drive you home after your exam. Your colonoscopy cannot be done unless you have a ride. If you need to use public transportation, someone must ride with you and stay with you for a minimum of 6-24 hours.  Check with your insurance company to be sure they will cover this exam.Arrange for a responsible adult to drive you home on the day of the exam. This cannot be a  taxi or a bus as you will need someone with you after the procedure.    7 days prior:  Talk to your prescribing provider: If you take blood thinners (such as Coumadin, Plavix, Xarelto, Eliquis, Lovenox, or others), these medications may need to be stopped temporarily before your procedure. Your prescribing provider will tell you what to do.   Talk to your prescribing provider: If you take prescription NSAIDS (such as Sulindac, Celebrex, Mobic, Relafen). Your prescribing provider will tell you what to do.   If you have diabetes, you should request an early morning appointment.  Stop taking fiber supplements and multivitamins containing iron, or any other medications containing iron.  Fill your prescription for 2 containers of Golytely and 4 Dulcolax tablets at the pharmacy.  It is very important that you stay well hydrated during the colonoscopy prep. The Golytely bowel prep is designed to clean out your colon, but it will not provide hydration. While you are taking the prescribed prep, you should also drink 64 oz. of Gatorade or similar sports drink product to drink for staying hydrated. (Avoid red and purple colors)  Stop eating corn, popcorn, nuts and foods with seeds.   Begin a restricted or low fiber diet (see list below).    2 days prior:  Drink fluids to be well hydrated, this is important. Drink at least 4 large glasses of water, Gatorade, or other similar sports drinks.  It is a good idea to use Vaseline on the skin around your anus after each bowel movement to prevent irritation. Wet wipes also help to reduce irritation.   You can have a light, low-fiber breakfast. You may also have a light, low-fiber lunch.  No solid foods after 1pm. Begin a clear liquid diet. (see list below).  At 4pm, take 2 Dulcolax (bisacodyl) tablets.  At 5pm, mix and drink half of a jug of Golytely bowel prep. Drink an 8 oz. Glass of Golytely every 10-15 minutes until half of the jug is gone. Place the remainder of the Golytely in  the refrigerator. Stay close to the bathroom.     1 day prior:  Continue clear liquid diet only (see examples below). Do not eat solid food this day.  Do not drink any red or purple colored drinks.  Stop taking NSAID pain relievers, such as Advil, Ibuprofen, Motrin, etc.  You may take Tylenol.  At 4 pm, take 2 Dulcolax (bisacodyl) tablets.  At 5 pm, drink the 2nd half of a jug of Golytely bowel prep. Drink an 8 oz. glass of Golytely every 10-15 minutes until the 1st jug of Golytely is gone.   The Golytely bowel prep will not keep you hydrated. You should drink 8-10 glasses of clear liquids throughout the day.  Before you go to bed, mix the 2nd container of Golytely and place in the refrigerator for the morning.      Procedure day:  6 hours before your check-in time, drink an 8 oz. Glass of Golytely every 10-15 minutes until half of the 2nd jug of Golytely is gone. You WILL NOT drink the entire 2nd jug of Golytely.   You may take your necessary morning medications with sips of water  Do not take diabetes medicine by mouth until after your exam.  You may drink clear liquids only up until 2 hours before your arrival time.  Do not smoke, chew tobacco, or swallow anything, including water or gum for at least 2 hours before your arrival time. This is a safety issue. Your procedure could be cancelled if you do not follow directions.  Please do not wear jewelry (i.e. earrings, rings, necklaces, watches, etc) . Leave your purse, billfold, credit cards, and other valuables at home.   Please arrive with a responsible adult who can take you home after the test and stay with you for a minimum of 24 hours: The medicine used will make you sleepy and forgetful. If you do not have someone to take you home, we will cancel your procedure. If using public transportation you must have someone to ride with you.  Please perform your nebulizer treatments and airway clearance therapy in the morning prior to the procedure (if  applicable).  If you have asthma, bring your inhalers.    CLEAR LIQUID DIET   You may have:  Water, tea, coffee (no milk or cream)  Soda pop, Gatorade (not red or purple)  Jell-O, Popsicles (no milk or fruit pieces - not red or purple)  Fat-free soup broth or bouillon  Plain hard candy, such as clear life savers (not red or purple)  Clear juices and fruit-flavored drinks, such as apple juice, white grape juice, Hi-C, and Alex-Aid (not red or purple)   Do not have:  Milk or milk products such as ice cream, malts or shakes, or coffee creamer  Red or purple drinks of any kind such as cranberry juice or grape juice. Avoid red or purple Jell-O, Popsicles, Alex-Aid, sorbet, sherbet and candy  Juices with pulp such as orange, grapefruit, pineapple or tomato juice  Cream soups of any kind  Alcohol and beer  Protein drinks or protein powder     LOW FIBER DIET   You may have:    Starches: White bread, rolls, biscuits, croissants, Opal toast, white flour tortillas, waffles, pancakes, Occitan toast; white rice, noodles, pasta, macaroni; cooked and peeled potatoes; plain crackers, saltines; cooked farina or cream of rice; puffed rice, corn flakes, Rice Krispies, Special K   Vegetables: tender cooked and canned, vegetable broths  Fruits and fruit juices: Strained fruit juice, canned fruit without seeds or skin (not pineapple), applesauce, pear sauce, ripe bananas, melons (not watermelon)   Milk products: Milk (plain or flavored), cheese, cottage cheese, yogurt (no berries), custard, ice cream    Proteins: Tender, well-cooked ground beef, lamb, veal, ham, pork, chicken, turkey, fish or organ meats; eggs; creamy peanut butter   Fats and condiments:  Margarine, butter, oils, mayonnaise, sour cream, salad dressing, plain gravy; spices, cooked herbs; sugar, clear jelly, honey, syrup   Snacks, sweets and drinks: Pretzels, hard candy; plain cakes and cookies (no nuts or seeds); gelatin, plain pudding, sherbet, Popsicles; coffee, tea,  carbonated ( fizzy ) drinks Do not have:    Starches: Breads or rolls that contain nuts, seeds or fruit; whole wheat or whole grain breads that contain more than 1 gram of fiber per slice; cornbread; corn or whole wheat tortillas; potatoes with skin; brown rice, wild rice, kasha (buckwheat), and oatmeal   Vegetables: Any raw or steamed vegetables; vegetables with seeds; corn in any form   Fruits and fruit juices: Prunes, prune juice, raisins and other dried fruits, berries and other fruits with seeds, canned pineapple juices with pulp such as orange, grapefruit, pineapple or tomato juice  Milk products: Any yogurt with nuts, seeds or berries   Proteins: Tough, fibrous meats with gristle; cooked dried beans, peas or lentils; crunchy peanut butter  Fats and condiments: Pickles, olives, relish, horseradish; jam, marmalade, preserves   Snacks, sweets and drinks: Popcorn, nuts, seeds, granola, coconut, candies made with nuts or seeds; all desserts that contain nuts, seeds, raisins and other dried fruits, coconut, whole grains or bran.      FAQ:    How do you know if your colon is cleaned out?   After completing the bowel prep, your bowel movements should be all liquid and yellow. Your bowel movements will look similar to urine in the toilet. If there are pieces of stool (poop) in the toilet, or if you can't see to the bottom of the toilet, please call our office for advice. Call [phone] and ask to speak with a nurse.   Why do you need a responsible  to take you home and stay with you?  We require a responsible adult to take you home for your safety. The sedation medicines used to relax you during the procedure can impair your judgement and reaction time, make you forgetful and possible a little unsteady. Do not drive, make any important decisions, or sign any legal documents for 24 hours after your procedure.   It is normal to feel bloated and gassy after your procedure. Walking will help move the air through  your colon. You can take non-aspirin pain relievers that contain acetaminophen (Tylenol).   When can you eat after your procedure?  You may resume your normal diet when you feel ready, unless advised otherwise by the doctor performing your procedure. Do not drink alcohol for 24 hours after your procedure.   You many resume normal activities (work, exercise, etc.) after 24 hours.   When will you get test results?  You should have your procedure results and any lab results (if applicable) by letter, [employer] message, or phone call within 2 weeks. If you have any questions, please call the doctor that referred you for the procedure.       Thank you for choosing [facility], for your procedure. If you are sent a survey regarding your care, please take the time to complete the questionnaire. We value your feedback!

## 2023-08-25 NOTE — LETTER
August 28, 2023      [facility]ogany Whitman  [address] [address]              Dear Mahogany Vides,     We apologize that we have been unable to contact you by phone. We are calling in regards to your upcoming Colonoscopy  procedure that is scheduled on 9/12/23 to complete pre assessment.    Please contact our pre assessment nursing team at your earliest convenience at [phone] option 4. We are open Monday through Friday, 7:00am to 5:00pm. Note that failure to complete Nurse assessment phone call may result in your procedure being cancelled.     Our schedules fill up quickly and are in high demand. If you know that you need to cancel, please contact us so that we can accommodate scheduling for other patients. Our endoscopy scheduling team can be reached at [phone] option 2.     Thank you,  [facility] Endoscopy Team

## 2023-08-25 NOTE — TELEPHONE ENCOUNTER
Pre visit planning completed.      Procedure details:    Patient scheduled for Colonoscopy  on 9/12/23.     Arrival time: 0745. Procedure time 0845    Pre op exam needed? N/A    [facility]; [address] [place] [address], 5th Floor, [place] [address]    Sedation type: Conscious sedation     Indication for procedure: screening      Chart review:     Electronic implanted devices? No    Diabetic? No    Diabetic medication HOLDING recommendations: (if applicable)  Oral diabetic medications: No  Diabetic injectables: No  Insulin: No      Medication review:    Anticoagulants? No    NSAIDS? No    Other medication HOLDING recommendations:  N/A      Prep for procedure:     Bowel prep recommendation: Extended prep Golytely    Due to:  constipation noted or reported.     Prep instructions sent via letter     Bowel prep script sent to   [facility] [place], [place] - [address]        Sonya Bryson RN  Endoscopy Procedure Pre Assessment RN  [phone] option 4

## 2023-08-28 NOTE — TELEPHONE ENCOUNTER
Attempted to contact patient in order to complete pre assessment questions.     Patient scheduled for Colonoscopy  on 9/12/23.     No answer. Left message to return call to [phone] option 4    Sonya Bryson RN  Endoscopy Procedure Pre Assessment RN

## 2023-09-05 NOTE — TELEPHONE ENCOUNTER
Second call attempt to complete pre assessment.     Patients son answered, states he borrowed her phone. Son will have patient call us back  [phone] #4     Additional information needed?  N/A      Charley Christensen RN  Endoscopy Procedure Pre Assessment RN

## 2023-09-06 NOTE — TELEPHONE ENCOUNTER
No return call received.   Pre assessment was not completed for upcoming scheduled procedure.     Staff message sent to endoscopy scheduling to cancel procedure per policy.       Charley Christensen, RN   Endoscopy Procedure Pre Assessment RN

## 2023-09-07 NOTE — TELEPHONE ENCOUNTER
Third call attempt to complete pre assessment. Son had phone previously and was advised to have patient call us.    No answer.  Left message to return call to [phone] #4 within 24 hours or risk procedure being cancelled.     Additional information needed?  N/A      Charley Christensen RN  Endoscopy Procedure Pre Assessment RN

## 2023-09-08 ENCOUNTER — TELEPHONE (OUTPATIENT)
Dept: GASTROENTEROLOGY | Facility: CLINIC | Age: 53
End: 2023-09-08
Payer: COMMERCIAL

## 2023-09-08 NOTE — TELEPHONE ENCOUNTER
Caller:   Reason for Reschedule/Cancellation (please be detailed, any staff messages or encounters to note?): pre assessment cancellation        Prior to reschedule please review:  Ordering Provider: troy  Sedation per order: cs  Does patient have any ASC Exclusions, please identify?:       Notes on Cancelled Procedure:  Procedure: Lower Endoscopy [Colonoscopy]   Date: 9/12  Location: [facility]; [address] [place] [address], 5th Floor, [place] [address]  Surgeon: adrián      Rescheduled: No  Procedure:    Date:   Location:   Surgeon:   Sedation Level Scheduled  ,  Reason for Sedation Level   Prep/Instructions updated and sent:        Send In - basket message to Panc - Larry Pool if EUS  procedure is canceled or rescheduled: [ N/A, YES or NO]

## 2023-11-09 DIAGNOSIS — B18.1 CHRONIC HEPATITIS B (H): Primary | ICD-10-CM

## 2023-11-17 ENCOUNTER — LAB (OUTPATIENT)
Dept: LAB | Facility: CLINIC | Age: 53
End: 2023-11-17
Attending: PHYSICIAN ASSISTANT
Payer: COMMERCIAL

## 2023-11-17 ENCOUNTER — OFFICE VISIT (OUTPATIENT)
Dept: GASTROENTEROLOGY | Facility: CLINIC | Age: 53
End: 2023-11-17
Attending: INTERNAL MEDICINE
Payer: COMMERCIAL

## 2023-11-17 VITALS
DIASTOLIC BLOOD PRESSURE: 72 MMHG | HEART RATE: 70 BPM | BODY MASS INDEX: 26.49 KG/M2 | HEIGHT: 65 IN | WEIGHT: 159 LBS | SYSTOLIC BLOOD PRESSURE: 104 MMHG

## 2023-11-17 DIAGNOSIS — B18.1 VIRAL HEPATITIS B CHRONIC (H): ICD-10-CM

## 2023-11-17 DIAGNOSIS — B18.1 VIRAL HEPATITIS B CHRONIC (H): Primary | ICD-10-CM

## 2023-11-17 DIAGNOSIS — B18.1 CHRONIC HEPATITIS B (H): ICD-10-CM

## 2023-11-17 LAB
ALBUMIN SERPL BCG-MCNC: 4.6 G/DL (ref 3.5–5.2)
ALP SERPL-CCNC: 65 U/L (ref 40–150)
ALT SERPL W P-5'-P-CCNC: 27 U/L (ref 0–50)
ANION GAP SERPL CALCULATED.3IONS-SCNC: 9 MMOL/L (ref 7–15)
AST SERPL W P-5'-P-CCNC: 30 U/L (ref 0–45)
BILIRUB DIRECT SERPL-MCNC: <0.2 MG/DL (ref 0–0.3)
BILIRUB SERPL-MCNC: 0.6 MG/DL
BUN SERPL-MCNC: 9.3 MG/DL (ref 6–20)
CALCIUM SERPL-MCNC: 9.7 MG/DL (ref 8.6–10)
CHLORIDE SERPL-SCNC: 104 MMOL/L (ref 98–107)
CREAT SERPL-MCNC: 0.48 MG/DL (ref 0.51–0.95)
DEPRECATED HCO3 PLAS-SCNC: 27 MMOL/L (ref 22–29)
EGFRCR SERPLBLD CKD-EPI 2021: >90 ML/MIN/1.73M2
ERYTHROCYTE [DISTWIDTH] IN BLOOD BY AUTOMATED COUNT: 12.7 % (ref 10–15)
GLUCOSE SERPL-MCNC: 100 MG/DL (ref 70–99)
HCT VFR BLD AUTO: 42 % (ref 35–47)
HGB BLD-MCNC: 14.7 G/DL (ref 11.7–15.7)
INR PPP: 1.21 (ref 0.85–1.15)
MCH RBC QN AUTO: 32.1 PG (ref 26.5–33)
MCHC RBC AUTO-ENTMCNC: 35 G/DL (ref 31.5–36.5)
MCV RBC AUTO: 92 FL (ref 78–100)
PLATELET # BLD AUTO: 194 10E3/UL (ref 150–450)
POTASSIUM SERPL-SCNC: 4.5 MMOL/L (ref 3.4–5.3)
PROT SERPL-MCNC: 7.9 G/DL (ref 6.4–8.3)
RBC # BLD AUTO: 4.58 10E6/UL (ref 3.8–5.2)
SODIUM SERPL-SCNC: 140 MMOL/L (ref 135–145)
WBC # BLD AUTO: 3.2 10E3/UL (ref 4–11)

## 2023-11-17 PROCEDURE — 36415 COLL VENOUS BLD VENIPUNCTURE: CPT | Performed by: PATHOLOGY

## 2023-11-17 PROCEDURE — G0463 HOSPITAL OUTPT CLINIC VISIT: HCPCS | Performed by: PHYSICIAN ASSISTANT

## 2023-11-17 PROCEDURE — 87517 HEPATITIS B DNA QUANT: CPT | Performed by: PHYSICIAN ASSISTANT

## 2023-11-17 PROCEDURE — 99000 SPECIMEN HANDLING OFFICE-LAB: CPT | Performed by: PATHOLOGY

## 2023-11-17 PROCEDURE — 91200 LIVER ELASTOGRAPHY: CPT | Mod: 26 | Performed by: PHYSICIAN ASSISTANT

## 2023-11-17 PROCEDURE — 99214 OFFICE O/P EST MOD 30 MIN: CPT | Mod: 25 | Performed by: PHYSICIAN ASSISTANT

## 2023-11-17 PROCEDURE — 85610 PROTHROMBIN TIME: CPT | Performed by: PATHOLOGY

## 2023-11-17 PROCEDURE — 82248 BILIRUBIN DIRECT: CPT | Performed by: PATHOLOGY

## 2023-11-17 PROCEDURE — 85027 COMPLETE CBC AUTOMATED: CPT | Performed by: PATHOLOGY

## 2023-11-17 PROCEDURE — 80053 COMPREHEN METABOLIC PANEL: CPT | Performed by: PATHOLOGY

## 2023-11-17 ASSESSMENT — PAIN SCALES - GENERAL: PAINLEVEL: NO PAIN (0)

## 2023-11-17 NOTE — PROGRESS NOTES
Hepatology Clinic note  Mahogany Whitman   Date of Birth 1970         Assessment/plan:   Mahogany Whitman is a 53 year old female with history of chronic hepatitis B, E antigen negative E antibody positive. HBV viremia historically low, more recent elevations in May 2023, 153,000 IU/mL, otherwise no HBV DNA within the last 3 to 5 years as we.     #Chronic hepatitis B, historically inactive:   - Transaminases also mildly elevated in March and May 2023 at which time she had been experiencing body aches/malaise as well as noted drop in platelets.  Today, transaminases are normal ALT 27 AST 30, platelets 194, which is reassuring. HBV DNA pending today.  - No current indications for treatment  - FibroScan completed today showing stage 0-1, 4.0 kPa   - HBV DNA pending today  - Will continue to monitor HBV DNA and hepatic panel for changes in viral activity     # HCC screening: US abdomen due in one year   - Reports US abdomen done recently (unable to find reports today)    # Labs: BMP, Hepatic panel, CBC, INR, AFP and HBV DNA in one year     # Follow-up in clinic in one year     Jacques Beach PA-C   [facility] Hepatology clinic    Total time for E/M services performed on the date of the encounter 30 minutes; excluding performing and official interpretation of fibrosis scan reads.  This included review of previous: clinic visits, hospital records, lab results, imaging studies, and procedural documentation. Time also includes patient visit, documentation and discussion with other providers.  The findings from this review are summarized in the above note.     -----------------------------------------------------       HPI:   Mahogany Whitman is a 53 year old female  who presents to clinic today for the following health issues:    Hepatitis B  E antigen negative  E antibody positive  -Diagnose: age 31  -History: likely early childhood transmission  -Prior biopsy: no   -Prior treatments: nil      Patient was last seen by Dr. Robin May 2023. No recent hospitalizations or ER visits.     Patient states that last visit she was very stiff and achy last time. That is improved now and she feels good.     Long history of constipations, BM every 3-5 days. Tries to drink a lot of fluid. Does not take any medication regularly.     Patient denies jaundice, lower extremity edema, abdominal distension or confusion.      Patient also denies melena, hematochezia or hematemesis.    Patient denies weight loss, fevers, sweats or chills.    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     Hepatitis B:   Lab Results   Component Value Date    HEPBANG Positive (A) 09/12/2014    HCVAB  01/09/2015     Nonreactive   Assay performance characteristics have not been established for newborns,   infants, and children       Lab Results   Component Value Date    HBQRESINST 157,000 (H) 05/23/2023     Lab Results   Component Value Date    HBEABY (A) 01/09/2015     Positive  Reference range: Negative  (Note)  The anti-HBe is repeatedly reactive, which is consistent  with recent or remote Hepatitis B infection. Anti-HBe can  be present in a small proportion of chronic HBV infections,  but its presence usually indicates resolution. If HBeAg is  also positive, this may represent the transition between  the appearance of anti-HBe and decline of HBeAg, and  retesting in one month may be useful.  Performed by [facility],  [address] [place] [id number] [phone]  [email or web address], Rolo Andersen MD, Lab. Director      HBEAGN  01/09/2015     Negative  Reference range: Negative  (Note)  Performed by [facility],  [address] [place] [id number] [phone]  [email or web address], Rolo Andersen MD, Lab. Director         Recent Labs   Lab Test 05/23/23  0653 09/29/16  1711 04/01/16  1639   ALKPHOS 63 58.5 86   ALT 55* 14.5 28   AST 34 22.6 20        Medical hx Surgical hx   Past Medical History:   Diagnosis Date     "Gestational diabetes     Past Surgical History:   Procedure Laterality Date    DILATION AND CURETTAGE, OPERATIVE HYSTEROSCOPY WITH MORCELLATOR, COMBINED N/A 5/2/2018    Procedure: COMBINED DILATION AND CURETTAGE, OPERATIVE HYSTEROSCOPY WITH MORCELLATOR;  Dilation and Curettage Hysteroscopy, Possible Mocellation of Polyp, Mirena IUD Placement;  Surgeon: Dominique Gomez MD;  Location: [facility] OR    INSERT INTRAUTERINE DEVICE N/A 5/2/2018    Procedure: INSERT INTRAUTERINE DEVICE;;  Surgeon: Dominique Gomez MD;  Location: [facility] OR    NO HISTORY OF SURGERY                   Physical Exam:   /72   Pulse 70   Ht 1.651 m (5' 5\")   Wt 72.1 kg (159 lb)   BMI 26.46 kg/m      Gen- well, NAD, A+Ox3, normal color  Lym- no palpable LAD  Abd- soft, non-tender  Extr- hands normal, no MICHELLE  Skin- no rash or jaundice  Neuro- no asterixis  Psych- normal mood         Data:   Reviewed in person and significant for:    Lab Results   Component Value Date     05/23/2023    .8 09/29/2016      Lab Results   Component Value Date    POTASSIUM 4.8 05/23/2023    POTASSIUM 3.7 09/29/2016     Lab Results   Component Value Date    CHLORIDE 101 05/23/2023    CHLORIDE 98.1 09/29/2016     Lab Results   Component Value Date    CO2 28 05/23/2023    CO2 27.4 09/29/2016     Lab Results   Component Value Date    BUN 12.0 05/23/2023    BUN 8.6 09/29/2016     Lab Results   Component Value Date    CR 0.45 05/23/2023    CR 0.4 09/29/2016       Lab Results   Component Value Date    WBC 3.2 05/23/2023    WBC 3.9 04/26/2018     Lab Results   Component Value Date    HGB 13.7 05/23/2023    HGB 13.2 01/27/2020     Lab Results   Component Value Date    HCT 39.8 05/23/2023    HCT 43.3 01/27/2020     Lab Results   Component Value Date    MCV 93 05/23/2023    MCV 99.7 01/27/2020     Lab Results   Component Value Date     05/23/2023     04/26/2018       Lab Results   Component Value Date    AST 34 05/23/2023    AST 22.6 09/29/2016 "     Lab Results   Component Value Date    ALT 55 05/23/2023    ALT 14.5 09/29/2016     Lab Results   Component Value Date    BILICONJ 0.0 03/03/2011      Lab Results   Component Value Date    BILITOTAL 0.3 05/23/2023    BILITOTAL <0.4 09/29/2016       Lab Results   Component Value Date    ALBUMIN 4.1 05/23/2023    ALBUMIN 3.7 04/01/2016     Lab Results   Component Value Date    PROTTOTAL 7.3 05/23/2023    PROTTOTAL 7.7 09/29/2016      Lab Results   Component Value Date    ALKPHOS 63 05/23/2023    ALKPHOS 58.5 09/29/2016       Lab Results   Component Value Date    INR 1.07 05/23/2023    INR 1.00 01/09/2015         No results found.    US abd RUQ May 2023  Order: [id number]  Impression    1. No cholelithiasis or secondary signs for cholecystitis.  2. Normal caliber biliary tree.    Dictated by Nitin Chopra MD @ 4/25/2023 8:20:17 AM    (Electronically Signed)  Narrative    For Patients:  As a result of the 21st Century Cures Act, medical imaging exams and procedure reports are released immediately into your electronic medical record.  You may view this report before your referring provider.  If you have questions, please contact your health care provider.      HISTORY:  Thrombocytopenia.    COMPARISON:  CT of the abdomen and pelvis, 02/02/2009.    TECHNIQUE:  Ultrasound of the abdomen limited.    FINDINGS:  The pancreas is obscured secondary to bowel gas. Proximal distal abdominal aorta are normal in caliber, measuring 2.2 and 1.4 cm respectively. IVC is normal. There is no solid hepatic mass. No dilation of intrahepatic biliary radicals. No perihepatic ascites. Liver morphology is non cirrhotic. There is no abdominal ascites. The common bile duct measures 4 mm. No common bile duct stone or mass. The right kidney measures 10.9 cm in length. There is no hydronephrosis, solid mass, or perinephric fluid collection.

## 2023-11-17 NOTE — LETTER
11/17/2023         RE: Mahogany Whitman  [address] [facility] [id number]        Dear Colleague,    Thank you for referring your patient, Mahogany Whitman, to the [facility] [place]. Please see a copy of my visit note below.    Hepatology Clinic note  Mahogany Whitman   Date of Birth 1970         Assessment/plan:   Mahogany Whitman is a 53 year old female with history of chronic hepatitis B, E antigen negative E antibody positive. HBV viremia historically low, more recent elevations in May 2023, 153,000 IU/mL, otherwise no HBV DNA within the last 3 to 5 years as we.     #Chronic hepatitis B, historically inactive:   - Transaminases also mildly elevated in March and May 2023 at which time she had been experiencing body aches/malaise as well as noted drop in platelets.  Today, transaminases are normal ALT 27 AST 30, platelets 194, which is reassuring. HBV DNA pending today.  - No current indications for treatment  - FibroScan completed today showing stage 0-1, 4.0 kPa   - HBV DNA pending today  - Will continue to monitor HBV DNA and hepatic panel for changes in viral activity     # HCC screening: US abdomen due in one year   - Reports US abdomen done recently (unable to find reports today)    # Labs: BMP, Hepatic panel, CBC, INR, AFP and HBV DNA in one year     # Follow-up in clinic in one year     Jacques Beach PA-C   [facility] Hepatology clinic    Total time for E/M services performed on the date of the encounter 30 minutes; excluding performing and official interpretation of fibrosis scan reads.  This included review of previous: clinic visits, hospital records, lab results, imaging studies, and procedural documentation. Time also includes patient visit, documentation and discussion with other providers.  The findings from this review are summarized in the above note.     -----------------------------------------------------        HPI:   Mahogany Whitman is a 53 year old female  who presents to clinic today for the following health issues:    Hepatitis B  E antigen negative  E antibody positive  -Diagnose: age 31  -History: likely early childhood transmission  -Prior biopsy: no   -Prior treatments: nil     Patient was last seen by Dr. Robin May 2023. No recent hospitalizations or ER visits.     Patient states that last visit she was very stiff and achy last time. That is improved now and she feels good.     Long history of constipations, BM every 3-5 days. Tries to drink a lot of fluid. Does not take any medication regularly.     Patient denies jaundice, lower extremity edema, abdominal distension or confusion.      Patient also denies melena, hematochezia or hematemesis.    Patient denies weight loss, fevers, sweats or chills.    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     Hepatitis B:   Lab Results   Component Value Date    HEPBANG Positive (A) 09/12/2014    HCVAB  01/09/2015     Nonreactive   Assay performance characteristics have not been established for newborns,   infants, and children       Lab Results   Component Value Date    HBQRESINST 157,000 (H) 05/23/2023     Lab Results   Component Value Date    HBEABY (A) 01/09/2015     Positive  Reference range: Negative  (Note)  The anti-HBe is repeatedly reactive, which is consistent  with recent or remote Hepatitis B infection. Anti-HBe can  be present in a small proportion of chronic HBV infections,  but its presence usually indicates resolution. If HBeAg is  also positive, this may represent the transition between  the appearance of anti-HBe and decline of HBeAg, and  retesting in one month may be useful.  Performed by [facility],  [address] [facility] [place] [address] [phone]  [email or web address], Rolo Andersen MD, Lab. Director      HBEAGN  01/09/2015     Negative  Reference range: Negative  (Note)  Performed by [facility],  [address], [facility] [place] [address]  "[phone]  [email or web address], Rolo Andersen MD, Lab. Director         Recent Labs   Lab Test 05/23/23  0653 09/29/16  1711 04/01/16  1639   ALKPHOS 63 58.5 86   ALT 55* 14.5 28   AST 34 22.6 20        Medical hx Surgical hx   Past Medical History:   Diagnosis Date    Gestational diabetes     Past Surgical History:   Procedure Laterality Date    DILATION AND CURETTAGE, OPERATIVE HYSTEROSCOPY WITH MORCELLATOR, COMBINED N/A 5/2/2018    Procedure: COMBINED DILATION AND CURETTAGE, OPERATIVE HYSTEROSCOPY WITH MORCELLATOR;  Dilation and Curettage Hysteroscopy, Possible Mocellation of Polyp, Mirena IUD Placement;  Surgeon: Dominique Gomez MD;  Location: [facility] OR    INSERT INTRAUTERINE DEVICE N/A 5/2/2018    Procedure: INSERT INTRAUTERINE DEVICE;;  Surgeon: Dominique Gomez MD;  Location:  OR    NO HISTORY OF SURGERY                   Physical Exam:   /72   Pulse 70   Ht 1.651 m (5' 5\")   Wt 72.1 kg (159 lb)   BMI 26.46 kg/m      Gen- well, NAD, A+Ox3, normal color  Lym- no palpable LAD  Abd- soft, non-tender  Extr- hands normal, no MICHELLE  Skin- no rash or jaundice  Neuro- no asterixis  Psych- normal mood         Data:   Reviewed in person and significant for:    Lab Results   Component Value Date     05/23/2023    .8 09/29/2016      Lab Results   Component Value Date    POTASSIUM 4.8 05/23/2023    POTASSIUM 3.7 09/29/2016     Lab Results   Component Value Date    CHLORIDE 101 05/23/2023    CHLORIDE 98.1 09/29/2016     Lab Results   Component Value Date    CO2 28 05/23/2023    CO2 27.4 09/29/2016     Lab Results   Component Value Date    BUN 12.0 05/23/2023    BUN 8.6 09/29/2016     Lab Results   Component Value Date    CR 0.45 05/23/2023    CR 0.4 09/29/2016       Lab Results   Component Value Date    WBC 3.2 05/23/2023    WBC 3.9 04/26/2018     Lab Results   Component Value Date    HGB 13.7 05/23/2023    HGB 13.2 01/27/2020     Lab Results   Component Value Date    HCT 39.8 05/23/2023    " HCT 43.3 01/27/2020     Lab Results   Component Value Date    MCV 93 05/23/2023    MCV 99.7 01/27/2020     Lab Results   Component Value Date     05/23/2023     04/26/2018       Lab Results   Component Value Date    AST 34 05/23/2023    AST 22.6 09/29/2016     Lab Results   Component Value Date    ALT 55 05/23/2023    ALT 14.5 09/29/2016     Lab Results   Component Value Date    BILICONJ 0.0 03/03/2011      Lab Results   Component Value Date    BILITOTAL 0.3 05/23/2023    BILITOTAL <0.4 09/29/2016       Lab Results   Component Value Date    ALBUMIN 4.1 05/23/2023    ALBUMIN 3.7 04/01/2016     Lab Results   Component Value Date    PROTTOTAL 7.3 05/23/2023    PROTTOTAL 7.7 09/29/2016      Lab Results   Component Value Date    ALKPHOS 63 05/23/2023    ALKPHOS 58.5 09/29/2016       Lab Results   Component Value Date    INR 1.07 05/23/2023    INR 1.00 01/09/2015         No results found.    US abd RUQ May 2023  Order: [id number]  Impression    1. No cholelithiasis or secondary signs for cholecystitis.  2. Normal caliber biliary tree.    Dictated by Nitin Chopra MD @ 4/25/2023 8:20:17 AM    (Electronically Signed)  Narrative    For Patients:  As a result of the 21st Century Cures Act, medical imaging exams and procedure reports are released immediately into your electronic medical record.  You may view this report before your referring provider.  If you have questions, please contact your health care provider.      HISTORY:  Thrombocytopenia.    COMPARISON:  CT of the abdomen and pelvis, 02/02/2009.    TECHNIQUE:  Ultrasound of the abdomen limited.    FINDINGS:  The pancreas is obscured secondary to bowel gas. Proximal distal abdominal aorta are normal in caliber, measuring 2.2 and 1.4 cm respectively. IVC is normal. There is no solid hepatic mass. No dilation of intrahepatic biliary radicals. No perihepatic ascites. Liver morphology is non cirrhotic. There is no abdominal ascites. The common bile duct  measures 4 mm. No common bile duct stone or mass. The right kidney measures 10.9 cm in length. There is no hydronephrosis, solid mass, or perinephric fluid collection.        Jacques Beach PA-C

## 2023-11-17 NOTE — NURSING NOTE
"Chief Complaint   Patient presents with    Follow Up     6 months     /72   Pulse 70   Ht 1.651 m (5' 5\")   Wt 72.1 kg (159 lb)   BMI 26.46 kg/m    Shirlene Carey CMA on 11/17/2023 at 7:56 AM    "

## 2023-11-18 LAB
HBV DNA SERPL NAA+PROBE-ACNC: ABNORMAL IU/ML
HBV DNA SERPL NAA+PROBE-LOG IU: 4.1 {LOG_IU}/ML

## 2024-11-22 ENCOUNTER — LAB (OUTPATIENT)
Dept: LAB | Facility: CLINIC | Age: 54
End: 2024-11-22
Payer: COMMERCIAL

## 2024-11-22 ENCOUNTER — ANCILLARY PROCEDURE (OUTPATIENT)
Dept: ULTRASOUND IMAGING | Facility: CLINIC | Age: 54
End: 2024-11-22
Attending: PHYSICIAN ASSISTANT
Payer: COMMERCIAL

## 2024-11-22 ENCOUNTER — OFFICE VISIT (OUTPATIENT)
Dept: GASTROENTEROLOGY | Facility: CLINIC | Age: 54
End: 2024-11-22
Attending: PHYSICIAN ASSISTANT
Payer: COMMERCIAL

## 2024-11-22 VITALS
DIASTOLIC BLOOD PRESSURE: 70 MMHG | SYSTOLIC BLOOD PRESSURE: 102 MMHG | HEART RATE: 73 BPM | BODY MASS INDEX: 26.29 KG/M2 | WEIGHT: 158 LBS

## 2024-11-22 DIAGNOSIS — B18.1 VIRAL HEPATITIS B CHRONIC (H): ICD-10-CM

## 2024-11-22 DIAGNOSIS — B18.1 VIRAL HEPATITIS B CHRONIC (H): Primary | ICD-10-CM

## 2024-11-22 LAB
AFP SERPL-MCNC: 4.1 NG/ML
ALBUMIN SERPL BCG-MCNC: 4.1 G/DL (ref 3.5–5.2)
ALP SERPL-CCNC: 65 U/L (ref 40–150)
ALT SERPL W P-5'-P-CCNC: 148 U/L (ref 0–50)
AST SERPL W P-5'-P-CCNC: 79 U/L (ref 0–45)
BILIRUB DIRECT SERPL-MCNC: <0.2 MG/DL (ref 0–0.3)
BILIRUB SERPL-MCNC: 0.4 MG/DL
ERYTHROCYTE [DISTWIDTH] IN BLOOD BY AUTOMATED COUNT: 12.9 % (ref 10–15)
HCT VFR BLD AUTO: 41 % (ref 35–47)
HGB BLD-MCNC: 14 G/DL (ref 11.7–15.7)
INR PPP: 1.07 (ref 0.85–1.15)
MCH RBC QN AUTO: 31.8 PG (ref 26.5–33)
MCHC RBC AUTO-ENTMCNC: 34.1 G/DL (ref 31.5–36.5)
MCV RBC AUTO: 93 FL (ref 78–100)
PLATELET # BLD AUTO: 183 10E3/UL (ref 150–450)
PROT SERPL-MCNC: 7.2 G/DL (ref 6.4–8.3)
RBC # BLD AUTO: 4.4 10E6/UL (ref 3.8–5.2)
WBC # BLD AUTO: 3.3 10E3/UL (ref 4–11)

## 2024-11-22 PROCEDURE — 86692 HEPATITIS DELTA AGENT ANTBDY: CPT | Mod: 90 | Performed by: PATHOLOGY

## 2024-11-22 PROCEDURE — 85027 COMPLETE CBC AUTOMATED: CPT | Performed by: PATHOLOGY

## 2024-11-22 PROCEDURE — 36415 COLL VENOUS BLD VENIPUNCTURE: CPT | Performed by: PATHOLOGY

## 2024-11-22 PROCEDURE — 80076 HEPATIC FUNCTION PANEL: CPT | Performed by: PATHOLOGY

## 2024-11-22 PROCEDURE — 99000 SPECIMEN HANDLING OFFICE-LAB: CPT | Performed by: PATHOLOGY

## 2024-11-22 PROCEDURE — G0463 HOSPITAL OUTPT CLINIC VISIT: HCPCS | Performed by: PHYSICIAN ASSISTANT

## 2024-11-22 PROCEDURE — 99215 OFFICE O/P EST HI 40 MIN: CPT | Performed by: PHYSICIAN ASSISTANT

## 2024-11-22 PROCEDURE — 82105 ALPHA-FETOPROTEIN SERUM: CPT | Performed by: PHYSICIAN ASSISTANT

## 2024-11-22 PROCEDURE — 76705 ECHO EXAM OF ABDOMEN: CPT | Mod: GC | Performed by: RADIOLOGY

## 2024-11-22 PROCEDURE — 87517 HEPATITIS B DNA QUANT: CPT | Performed by: PHYSICIAN ASSISTANT

## 2024-11-22 PROCEDURE — 85610 PROTHROMBIN TIME: CPT | Performed by: PATHOLOGY

## 2024-11-22 ASSESSMENT — PAIN SCALES - GENERAL: PAINLEVEL_OUTOF10: NO PAIN (0)

## 2024-11-22 NOTE — NURSING NOTE
"Chief Complaint   Patient presents with    RECHECK     Vital signs:      BP: 102/70 Pulse: 73             Weight: 71.7 kg (158 lb)  Estimated body mass index is 26.29 kg/m  as calculated from the following:    Height as of 11/17/23: 1.651 m (5' 5\").    Weight as of this encounter: 71.7 kg (158 lb).      Yee Denson CMA   11/22/2024 8:56 AM    "

## 2024-11-22 NOTE — PROGRESS NOTES
Hepatology Clinic note  Mahogany Whitman   Date of Birth 1970         Assessment/plan:   Mahogany Whitman is a 54 year old female with history of chronic hepatitis B, E antigen negative E antibody positive.  She has had variable HBV DNA in recent years, ranging from 13,00 - 153,000 IU/mL. December 2023, stage 0-1, 4.1 kPa. Grade 1 steatosis.    #Chronic hepatitis B, historically felt to be inactive but concern for active disease with elevated transaminases today:   - HBV DNA pending today  - Discussed indications for treatment, side effects of medication if needed.  Discussed that if HBV DNA is elevated,  recommend that she start hepatitis B treatment with antiviral. She reports being a bit hesitant to start treatment and would like to monitor labs closely.  Will give her a call after her HBV DNA has current.  - Repeat hepatic panel and HBV DNA in 1 month.    #HCC screening, up-to-date:  - US abdomen in 3 months    # Follow-up in clinic in in 3 months    Jacques Beach PA-C   [facility] Hepatology clinic    Total time for E/M services performed on the date of the encounter 45 minutes.  This included review of previous: clinic visits, hospital records, lab results, imaging studies, and procedural documentation. Time also includes patient visit, documentation and discussion with other providers.  The findings from this review are summarized in the above note.     -----------------------------------------------------       HPI:   Mahogany Whitman is a 54 year old female who presents to clinic today for the following health issues accompanied by [place]  on phone.     Hepatitis B  E antigen negative  E antibody positive  -Diagnose: age 31  -History: likely early childhood transmission  -Prior biopsy: no   -Prior treatments: nil   FibroScan: December 2023, stage 0-1, 4.1 kPa. Grade 1 steatosis.    Patient was last seen by me on me on November 17, 2023.   No recent hospitalizations or  ER visits. No new medications.     Appetite is good.  Weight stable.  Has no particular complaints or concerns today.    Patient denies jaundice, lower extremity edema, abdominal distension or confusion.    Patient also denies melena, hematochezia or hematemesis.  Patient denies weight loss, fevers, sweats or chills.    No alcohol. She has 10 children.     Hepatitis B:   Lab Results   Component Value Date    HEPBANG Positive (A) 09/12/2014    HCVAB  01/09/2015     Nonreactive   Assay performance characteristics have not been established for newborns,   infants, and children       Lab Results   Component Value Date    HBQRESINST 13,100 (H) 11/17/2023    HBQRESINST 157,000 (H) 05/23/2023     Lab Results   Component Value Date    HBEABY (A) 01/09/2015     Positive  Reference range: Negative  (Note)  The anti-HBe is repeatedly reactive, which is consistent  with recent or remote Hepatitis B infection. Anti-HBe can  be present in a small proportion of chronic HBV infections,  but its presence usually indicates resolution. If HBeAg is  also positive, this may represent the transition between  the appearance of anti-HBe and decline of HBeAg, and  retesting in one month may be useful.  Performed by [facility],  [address] [place] [id number] [phone]  [email or web address], Rolo Andersen MD, Lab. Director      HBEAGN  01/09/2015     Negative  Reference range: Negative  (Note)  Performed by [facility],  [address] [place] [address] [phone]  [email or web address], Rolo Andersen MD, Lab. Director       Recent Labs   Lab Test 11/22/24  0756 11/17/23  0731 05/23/23  0653 09/29/16  1711   ALKPHOS 65 65 63 58.5   * 27 55* 14.5   AST 79* 30 34 22.6   BILITOTAL 0.4 0.6 0.3 <0.4        Medical hx Surgical hx   Past Medical History:   Diagnosis Date    Gestational diabetes     Past Surgical History:   Procedure Laterality Date    DILATION AND CURETTAGE, OPERATIVE HYSTEROSCOPY WITH MORCELLATOR, COMBINED N/A  5/2/2018    Procedure: COMBINED DILATION AND CURETTAGE, OPERATIVE HYSTEROSCOPY WITH MORCELLATOR;  Dilation and Curettage Hysteroscopy, Possible Mocellation of Polyp, Mirena IUD Placement;  Surgeon: Dominique Gomez MD;  Location: [facility] OR    INSERT INTRAUTERINE DEVICE N/A 5/2/2018    Procedure: INSERT INTRAUTERINE DEVICE;;  Surgeon: Dominique Gomez MD;  Location: [facility] OR    NO HISTORY OF SURGERY                 Physical Exam:   /70   Pulse 73   Wt 71.7 kg (158 lb)   BMI 26.29 kg/m      Gen- well, NAD, A+Ox3, normal color  Lym- no palpable LAD  Abd- soft, nontender  Extr- hands normal, no MICHELLE  Skin- no rash or jaundice  Neuro- no asterixis  Psych- normal mood         Data:   Reviewed in person and significant for:    Lab Results   Component Value Date     11/17/2023    .8 09/29/2016      Lab Results   Component Value Date    POTASSIUM 4.5 11/17/2023    POTASSIUM 3.7 09/29/2016     Lab Results   Component Value Date    CHLORIDE 104 11/17/2023    CHLORIDE 98.1 09/29/2016     Lab Results   Component Value Date    CO2 27 11/17/2023    CO2 27.4 09/29/2016     Lab Results   Component Value Date    BUN 9.3 11/17/2023    BUN 8.6 09/29/2016     Lab Results   Component Value Date    CR 0.48 11/17/2023    CR 0.4 09/29/2016       Lab Results   Component Value Date    WBC 3.2 11/17/2023    WBC 3.9 04/26/2018     Lab Results   Component Value Date    HGB 14.7 11/17/2023    HGB 13.2 01/27/2020     Lab Results   Component Value Date    HCT 42.0 11/17/2023    HCT 43.3 01/27/2020     Lab Results   Component Value Date    MCV 92 11/17/2023    MCV 99.7 01/27/2020     Lab Results   Component Value Date     11/17/2023     04/26/2018       Lab Results   Component Value Date    AST 30 11/17/2023    AST 22.6 09/29/2016     Lab Results   Component Value Date    ALT 27 11/17/2023    ALT 14.5 09/29/2016     Lab Results   Component Value Date    BILICONJ 0.0 03/03/2011      Lab Results   Component Value  Date    BILITOTAL 0.6 11/17/2023    BILITOTAL <0.4 09/29/2016       Lab Results   Component Value Date    ALBUMIN 4.6 11/17/2023    ALBUMIN 3.7 04/01/2016     Lab Results   Component Value Date    PROTTOTAL 7.9 11/17/2023    PROTTOTAL 7.7 09/29/2016      Lab Results   Component Value Date    ALKPHOS 65 11/17/2023    ALKPHOS 58.5 09/29/2016       Lab Results   Component Value Date    INR 1.21 11/17/2023    INR 1.00 01/09/2015       EXAMINATION: US ABDOMEN LIMITED, 11/22/2024 7:36 AM      INDICATION: Patient at high risk for HCC. HCC screening; Viral  hepatitis B chronic (H)     COMPARISON: Ultrasound 4/25/2023, 5/5/2016.     TECHNIQUE: The abdomen right upper quadrant was scanned in the  standard fashion with specialized ultrasound transducer(s) using both  gray scale and limited color/spectral Doppler techniques.     FINDINGS:   Fluid: No evidence of ascites or pleural effusions.     Liver: The liver demonstrates normal echotexture, measuring 12.5 cm in  craniocaudal dimension. No focal hepatic mass. No intrahepatic biliary  dilatation. The main portal vein is patent with antegrade flow  measuring 1.3 cm in diameter.     US visualization score: A - No or minimal limitations     Gallbladder: The gallbladder is well distended and of normal  morphology. No wall thickening, pericholecystic fluid, sonographic  Pinzon's sign, or evidence of cholelithiasis.     Bile Ducts: Normal caliber intra and extrahepatic biliary tree. The  common bile duct measures 4 mm in diameter.     Pancreas: Visualized portions of the pancreas are unremarkable.      Kidney: The right kidney measures 10.5 cm in long dimension. No  hydronephrosis, hydroureter, shadowing renal calculi, or solid mass.  The capsule and parenchyma demonstrate normal echogenicity.     Aorta and IVC: The visualized portions of the aorta and IVC are  unremarkable.                                                                       IMPRESSION:   1. Normal morphology of  the liver without focal lesion.  a. LI-RADS US Category: US-1 Negative: No US evidence of HCC  b. Recommend continued surveillance US.     *Recommendations above based on LI-RADS? v2017:  https://www.acr.org/Clinical-Resources/Reporting-and-Data-Systems/LI-R  DS/LI-RADS-Ultrasound-v2017     I have personally reviewed the examination and initial interpretation  and I agree with the findings.     DWAINE GEORGES MD

## 2024-11-22 NOTE — LETTER
11/22/2024      [place]ogany Whitman  [address] [facility] [place] [address]      Dear Colleague,    Thank you for referring your patient, Mahogany Whitman, to the [facility] [place]. Please see a copy of my visit note below.    Hepatology Clinic note  Mahogany Whitman   Date of Birth 1970         Assessment/plan:   Mahogany Whitman is a 54 year old female with history of chronic hepatitis B, E antigen negative E antibody positive.  She has had variable HBV DNA in recent years, ranging from 13,00 - 153,000 IU/mL. December 2023, stage 0-1, 4.1 kPa. Grade 1 steatosis.    #Chronic hepatitis B, historically felt to be inactive but concern for active disease with elevated transaminases today:   - HBV DNA pending today  - Discussed indications for treatment, side effects of medication if needed.  Discussed that if HBV DNA is elevated,  recommend that she start hepatitis B treatment with antiviral. She reports being a bit hesitant to start treatment and would like to monitor labs closely.  Will give her a call after her HBV DNA has current.  - Repeat hepatic panel and HBV DNA in 1 month.    #HCC screening, up-to-date:  - US abdomen in 3 months    # Follow-up in clinic in in 3 months    Jacques Beach PA-C   [facility] Hepatology clinic    Total time for E/M services performed on the date of the encounter 45 minutes.  This included review of previous: clinic visits, hospital records, lab results, imaging studies, and procedural documentation. Time also includes patient visit, documentation and discussion with other providers.  The findings from this review are summarized in the above note.     -----------------------------------------------------       HPI:   Mahogany Whitman is a 54 year old female who presents to clinic today for the following health issues accompanied by Chinese  on phone.     Hepatitis B  E antigen negative  E antibody  positive  -Diagnose: age 31  -History: likely early childhood transmission  -Prior biopsy: no   -Prior treatments: nil   FibroScan: December 2023, stage 0-1, 4.1 kPa. Grade 1 steatosis.    Patient was last seen by me on me on November 17, 2023.   No recent hospitalizations or ER visits. No new medications.     Appetite is good.  Weight stable.  Has no particular complaints or concerns today.    Patient denies jaundice, lower extremity edema, abdominal distension or confusion.    Patient also denies melena, hematochezia or hematemesis.  Patient denies weight loss, fevers, sweats or chills.    No alcohol. She has 10 children.     Hepatitis B:   Lab Results   Component Value Date    HEPBANG Positive (A) 09/12/2014    HCVAB  01/09/2015     Nonreactive   Assay performance characteristics have not been established for newborns,   infants, and children       Lab Results   Component Value Date    HBQRESINST 13,100 (H) 11/17/2023    HBQRESINST 157,000 (H) 05/23/2023     Lab Results   Component Value Date    HBEABY (A) 01/09/2015     Positive  Reference range: Negative  (Note)  The anti-HBe is repeatedly reactive, which is consistent  with recent or remote Hepatitis B infection. Anti-HBe can  be present in a small proportion of chronic HBV infections,  but its presence usually indicates resolution. If HBeAg is  also positive, this may represent the transition between  the appearance of anti-HBe and decline of HBeAg, and  retesting in one month may be useful.  Performed by [facility],  [address] [place] [address] [phone]  [email or web address], Rolo Andersen MD, Lab. Director      HBEAGN  01/09/2015     Negative  Reference range: Negative  (Note)  Performed by [facility],  [address] [facility] [place] [address] [phone]  [email or web address], Rolo Andersen MD, Lab. Director       Recent Labs   Lab Test 11/22/24  0756 11/17/23  0731 05/23/23  0653 09/29/16  1711   ALKPHOS 65 65 63 58.5   * 27 55* 14.5   AST  79* 30 34 22.6   BILITOTAL 0.4 0.6 0.3 <0.4        Medical hx Surgical hx   Past Medical History:   Diagnosis Date     Gestational diabetes     Past Surgical History:   Procedure Laterality Date     DILATION AND CURETTAGE, OPERATIVE HYSTEROSCOPY WITH MORCELLATOR, COMBINED N/A 5/2/2018    Procedure: COMBINED DILATION AND CURETTAGE, OPERATIVE HYSTEROSCOPY WITH MORCELLATOR;  Dilation and Curettage Hysteroscopy, Possible Mocellation of Polyp, Mirena IUD Placement;  Surgeon: Dominique Gomez MD;  [facility]     INSERT INTRAUTERINE DEVICE N/A 5/2/2018    Procedure: INSERT INTRAUTERINE DEVICE;;  Surgeon: Dominique Gomez MD;  Location: [facility] OR     NO HISTORY OF SURGERY                 Physical Exam:   /70   Pulse 73   Wt 71.7 kg (158 lb)   BMI 26.29 kg/m      Gen- well, NAD, A+Ox3, normal color  Lym- no palpable LAD  Abd- soft, nontender  Extr- hands normal, no MICHELLE  Skin- no rash or jaundice  Neuro- no asterixis  Psych- normal mood         Data:   Reviewed in person and significant for:    Lab Results   Component Value Date     11/17/2023    .8 09/29/2016      Lab Results   Component Value Date    POTASSIUM 4.5 11/17/2023    POTASSIUM 3.7 09/29/2016     Lab Results   Component Value Date    CHLORIDE 104 11/17/2023    CHLORIDE 98.1 09/29/2016     Lab Results   Component Value Date    CO2 27 11/17/2023    CO2 27.4 09/29/2016     Lab Results   Component Value Date    BUN 9.3 11/17/2023    BUN 8.6 09/29/2016     Lab Results   Component Value Date    CR 0.48 11/17/2023    CR 0.4 09/29/2016       Lab Results   Component Value Date    WBC 3.2 11/17/2023    WBC 3.9 04/26/2018     Lab Results   Component Value Date    HGB 14.7 11/17/2023    HGB 13.2 01/27/2020     Lab Results   Component Value Date    HCT 42.0 11/17/2023    HCT 43.3 01/27/2020     Lab Results   Component Value Date    MCV 92 11/17/2023    MCV 99.7 01/27/2020     Lab Results   Component Value Date     11/17/2023      04/26/2018       Lab Results   Component Value Date    AST 30 11/17/2023    AST 22.6 09/29/2016     Lab Results   Component Value Date    ALT 27 11/17/2023    ALT 14.5 09/29/2016     Lab Results   Component Value Date    BILICONJ 0.0 03/03/2011      Lab Results   Component Value Date    BILITOTAL 0.6 11/17/2023    BILITOTAL <0.4 09/29/2016       Lab Results   Component Value Date    ALBUMIN 4.6 11/17/2023    ALBUMIN 3.7 04/01/2016     Lab Results   Component Value Date    PROTTOTAL 7.9 11/17/2023    PROTTOTAL 7.7 09/29/2016      Lab Results   Component Value Date    ALKPHOS 65 11/17/2023    ALKPHOS 58.5 09/29/2016       Lab Results   Component Value Date    INR 1.21 11/17/2023    INR 1.00 01/09/2015       EXAMINATION: US ABDOMEN LIMITED, 11/22/2024 7:36 AM      INDICATION: Patient at high risk for HCC. HCC screening; Viral  hepatitis B chronic (H)     COMPARISON: Ultrasound 4/25/2023, 5/5/2016.     TECHNIQUE: The abdomen right upper quadrant was scanned in the  standard fashion with specialized ultrasound transducer(s) using both  gray scale and limited color/spectral Doppler techniques.     FINDINGS:   Fluid: No evidence of ascites or pleural effusions.     Liver: The liver demonstrates normal echotexture, measuring 12.5 cm in  craniocaudal dimension. No focal hepatic mass. No intrahepatic biliary  dilatation. The main portal vein is patent with antegrade flow  measuring 1.3 cm in diameter.     US visualization score: A - No or minimal limitations     Gallbladder: The gallbladder is well distended and of normal  morphology. No wall thickening, pericholecystic fluid, sonographic  Pinzon's sign, or evidence of cholelithiasis.     Bile Ducts: Normal caliber intra and extrahepatic biliary tree. The  common bile duct measures 4 mm in diameter.     Pancreas: Visualized portions of the pancreas are unremarkable.      Kidney: The right kidney measures 10.5 cm in long dimension. No  hydronephrosis, hydroureter, shadowing  renal calculi, or solid mass.  The capsule and parenchyma demonstrate normal echogenicity.     Aorta and IVC: The visualized portions of the aorta and IVC are  unremarkable.                                                                       IMPRESSION:   1. Normal morphology of the liver without focal lesion.  a. LI-RADS US Category: US-1 Negative: No US evidence of HCC  b. Recommend continued surveillance US.     *Recommendations above based on LI-RADS? v2017:  https://www.acr.org/Clinical-Resources/Reporting-and-Data-Systems/LI-R  DS/LI-RADS-Ultrasound-v2017     I have personally reviewed the examination and initial interpretation  and I agree with the findings.     DWAINE GEORGES MD          Again, thank you for allowing me to participate in the care of your patient.        Sincerely,        Jacques Beach PA-C

## 2024-11-25 LAB
HBV DNA SERPL NAA+PROBE-ACNC: ABNORMAL IU/ML
HBV DNA SERPL NAA+PROBE-LOG IU: 6.2 {LOG_IU}/ML
HDV AB SER QL IA: NEGATIVE

## 2024-11-26 ENCOUNTER — TELEPHONE (OUTPATIENT)
Dept: GASTROENTEROLOGY | Facility: CLINIC | Age: 54
End: 2024-11-26
Payer: COMMERCIAL

## 2024-11-26 DIAGNOSIS — B18.1 VIRAL HEPATITIS B CHRONIC (H): Primary | ICD-10-CM

## 2024-11-26 RX ORDER — TENOFOVIR DISOPROXIL FUMARATE 300 MG/1
300 TABLET, FILM COATED ORAL DAILY
Qty: 90 TABLET | Refills: 3 | Status: SHIPPED | OUTPATIENT
Start: 2024-11-26

## 2024-11-26 NOTE — TELEPHONE ENCOUNTER
Called patient with  to relay below message to patient. Discussed hepatitis B medication with patient. Explained recent LFTs and increased virus level. Explained side effects are minimal with some nausea. Patient ultimately agreeable to starting medication. Viread sent to pharmacy.    IMAN MichelleN, RN, PHN  Hepatology Clinic  [facility]  [facility]    ----- Message from Jacques Beach sent at 11/26/2024 11:25 AM CST -----  Please call and send her a letter.   I would prefer that she starts Hep B medication now rather than monitoring labs in one month. Please let me know what she decides to do.     ThanksJacques  ----- Message -----  From: Lab, Background User  Sent: 11/22/2024   7:59 AM CST  To: Jacques Beach PA-C

## 2024-12-21 ENCOUNTER — HEALTH MAINTENANCE LETTER (OUTPATIENT)
Age: 54
End: 2024-12-21

## 2025-02-20 ENCOUNTER — LAB (OUTPATIENT)
Dept: LAB | Facility: CLINIC | Age: 55
End: 2025-02-20
Payer: COMMERCIAL

## 2025-02-20 DIAGNOSIS — B18.1 VIRAL HEPATITIS B CHRONIC (H): ICD-10-CM

## 2025-02-20 DIAGNOSIS — B19.10 HEPATITIS B INFECTION: ICD-10-CM

## 2025-02-20 DIAGNOSIS — R94.5 NONSPECIFIC ABNORMAL RESULTS OF LIVER FUNCTION STUDY: Primary | ICD-10-CM

## 2025-02-20 DIAGNOSIS — B34.3: ICD-10-CM

## 2025-02-20 DIAGNOSIS — O98.511: ICD-10-CM

## 2025-02-20 LAB
ALBUMIN SERPL BCG-MCNC: 4.1 G/DL (ref 3.5–5.2)
ALP SERPL-CCNC: 75 U/L (ref 40–150)
ALT SERPL W P-5'-P-CCNC: 304 U/L (ref 0–50)
ANION GAP SERPL CALCULATED.3IONS-SCNC: 10 MMOL/L (ref 7–15)
AST SERPL W P-5'-P-CCNC: 146 U/L (ref 0–45)
BASOPHILS # BLD AUTO: 0 10E3/UL (ref 0–0.2)
BASOPHILS NFR BLD AUTO: 0 %
BILIRUB DIRECT SERPL-MCNC: 0.19 MG/DL (ref 0–0.3)
BILIRUB SERPL-MCNC: 0.6 MG/DL
BUN SERPL-MCNC: 6.9 MG/DL (ref 6–20)
CALCIUM SERPL-MCNC: 9.3 MG/DL (ref 8.8–10.4)
CHLORIDE SERPL-SCNC: 100 MMOL/L (ref 98–107)
CREAT SERPL-MCNC: 0.45 MG/DL (ref 0.51–0.95)
EGFRCR SERPLBLD CKD-EPI 2021: >90 ML/MIN/1.73M2
EOSINOPHIL # BLD AUTO: 0.1 10E3/UL (ref 0–0.7)
EOSINOPHIL NFR BLD AUTO: 2 %
ERYTHROCYTE [DISTWIDTH] IN BLOOD BY AUTOMATED COUNT: 13.3 % (ref 10–15)
GLUCOSE SERPL-MCNC: 88 MG/DL (ref 70–99)
HCO3 SERPL-SCNC: 28 MMOL/L (ref 22–29)
HCT VFR BLD AUTO: 39.4 % (ref 35–47)
HGB BLD-MCNC: 13.9 G/DL (ref 11.7–15.7)
IMM GRANULOCYTES # BLD: 0 10E3/UL
IMM GRANULOCYTES NFR BLD: 0 %
INR PPP: 1.13 (ref 0.85–1.15)
LYMPHOCYTES # BLD AUTO: 2 10E3/UL (ref 0.8–5.3)
LYMPHOCYTES NFR BLD AUTO: 51 %
MCH RBC QN AUTO: 32.5 PG (ref 26.5–33)
MCHC RBC AUTO-ENTMCNC: 35.3 G/DL (ref 31.5–36.5)
MCV RBC AUTO: 92 FL (ref 78–100)
MONOCYTES # BLD AUTO: 0.5 10E3/UL (ref 0–1.3)
MONOCYTES NFR BLD AUTO: 12 %
NEUTROPHILS # BLD AUTO: 1.4 10E3/UL (ref 1.6–8.3)
NEUTROPHILS NFR BLD AUTO: 35 %
NRBC # BLD AUTO: 0 10E3/UL
NRBC BLD AUTO-RTO: 0 /100
PLATELET # BLD AUTO: 169 10E3/UL (ref 150–450)
POTASSIUM SERPL-SCNC: 4.6 MMOL/L (ref 3.4–5.3)
PROT SERPL-MCNC: 7.4 G/DL (ref 6.4–8.3)
RBC # BLD AUTO: 4.28 10E6/UL (ref 3.8–5.2)
SODIUM SERPL-SCNC: 138 MMOL/L (ref 135–145)
WBC # BLD AUTO: 3.9 10E3/UL (ref 4–11)

## 2025-02-20 PROCEDURE — 87517 HEPATITIS B DNA QUANT: CPT

## 2025-02-20 PROCEDURE — 36415 COLL VENOUS BLD VENIPUNCTURE: CPT

## 2025-02-20 PROCEDURE — 82310 ASSAY OF CALCIUM: CPT

## 2025-02-20 PROCEDURE — 82040 ASSAY OF SERUM ALBUMIN: CPT

## 2025-02-20 PROCEDURE — 82248 BILIRUBIN DIRECT: CPT

## 2025-02-20 PROCEDURE — 85610 PROTHROMBIN TIME: CPT

## 2025-02-20 PROCEDURE — 85025 COMPLETE CBC W/AUTO DIFF WBC: CPT

## 2025-02-20 PROCEDURE — 84520 ASSAY OF UREA NITROGEN: CPT

## 2025-02-20 PROCEDURE — 82247 BILIRUBIN TOTAL: CPT

## 2025-02-21 ENCOUNTER — LAB (OUTPATIENT)
Dept: LAB | Facility: CLINIC | Age: 55
End: 2025-02-21
Payer: COMMERCIAL

## 2025-02-21 DIAGNOSIS — B19.10 HEPATITIS B INFECTION: ICD-10-CM

## 2025-02-21 DIAGNOSIS — R94.5 ABNORMAL FINDING ON LIVER FUNCTION: ICD-10-CM

## 2025-02-21 DIAGNOSIS — R94.5 ABNORMAL FINDING ON LIVER FUNCTION: Primary | ICD-10-CM

## 2025-02-21 LAB
HBV DNA SERPL NAA+PROBE-ACNC: ABNORMAL IU/ML
HBV DNA SERPL NAA+PROBE-LOG IU: 5.9 {LOG_IU}/ML

## 2025-02-21 PROCEDURE — 87338 HPYLORI STOOL AG IA: CPT

## 2025-02-24 LAB — H PYLORI AG STL QL IA: NEGATIVE

## 2025-02-25 ENCOUNTER — DOCUMENTATION ONLY (OUTPATIENT)
Dept: GASTROENTEROLOGY | Facility: CLINIC | Age: 55
End: 2025-02-25
Payer: COMMERCIAL

## 2025-02-25 NOTE — PROGRESS NOTES
Called patient to cancel lab appointment on 2/28 as labs were completed recently. Will see provider as scheduled. Attempted x 2. Phone # rings then stops.     IMAN MichelleN, RN, PHN  Hepatology Clinic  [facility]  [facility]

## 2025-03-13 ENCOUNTER — TELEPHONE (OUTPATIENT)
Dept: GASTROENTEROLOGY | Facility: CLINIC | Age: 55
End: 2025-03-13
Payer: COMMERCIAL

## 2025-03-13 NOTE — TELEPHONE ENCOUNTER
Called patient with . Attempted x 2 - patient answered phone but did not speak. Will attempt again 3/14.    ANA LAURA Michelle, RN, N  Hepatology Clinic  [facility]  [facility]      ----- Message from Jacques Beach sent at 3/13/2025 12:12 PM CDT -----  Please let patient know liver inflammation labs very elevated and she has a high Hep B level. It is very important for her to be on Hep B medication.     Please see if there were any side effects or problems getting mediation from pharmacy.     Should have follow up labs in 1 months, hepatic panel and HBV DNA.

## 2025-03-20 DIAGNOSIS — B19.10 VIRAL HEPATITIS B: Primary | ICD-10-CM

## 2025-04-14 ENCOUNTER — LAB (OUTPATIENT)
Dept: LAB | Facility: CLINIC | Age: 55
End: 2025-04-14
Payer: COMMERCIAL

## 2025-04-14 DIAGNOSIS — B19.10 VIRAL HEPATITIS B: ICD-10-CM

## 2025-04-14 LAB
BASOPHILS # BLD AUTO: 0 10E3/UL (ref 0–0.2)
BASOPHILS NFR BLD AUTO: 0 %
EOSINOPHIL # BLD AUTO: 0.1 10E3/UL (ref 0–0.7)
EOSINOPHIL NFR BLD AUTO: 2 %
ERYTHROCYTE [DISTWIDTH] IN BLOOD BY AUTOMATED COUNT: 12.5 % (ref 10–15)
HCT VFR BLD AUTO: 40.8 % (ref 35–47)
HGB BLD-MCNC: 13.9 G/DL (ref 11.7–15.7)
IMM GRANULOCYTES # BLD: 0 10E3/UL
IMM GRANULOCYTES NFR BLD: 0 %
LYMPHOCYTES # BLD AUTO: 2.1 10E3/UL (ref 0.8–5.3)
LYMPHOCYTES NFR BLD AUTO: 53 %
MCH RBC QN AUTO: 32.3 PG (ref 26.5–33)
MCHC RBC AUTO-ENTMCNC: 34.1 G/DL (ref 31.5–36.5)
MCV RBC AUTO: 95 FL (ref 78–100)
MONOCYTES # BLD AUTO: 0.4 10E3/UL (ref 0–1.3)
MONOCYTES NFR BLD AUTO: 10 %
NEUTROPHILS # BLD AUTO: 1.3 10E3/UL (ref 1.6–8.3)
NEUTROPHILS NFR BLD AUTO: 34 %
PLATELET # BLD AUTO: 167 10E3/UL (ref 150–450)
RBC # BLD AUTO: 4.31 10E6/UL (ref 3.8–5.2)
WBC # BLD AUTO: 3.9 10E3/UL (ref 4–11)

## 2025-04-14 PROCEDURE — 80053 COMPREHEN METABOLIC PANEL: CPT

## 2025-04-14 PROCEDURE — 87517 HEPATITIS B DNA QUANT: CPT

## 2025-04-14 PROCEDURE — 85025 COMPLETE CBC W/AUTO DIFF WBC: CPT

## 2025-04-14 PROCEDURE — 36415 COLL VENOUS BLD VENIPUNCTURE: CPT

## 2025-04-14 PROCEDURE — 86140 C-REACTIVE PROTEIN: CPT

## 2025-04-15 LAB
ALBUMIN SERPL BCG-MCNC: 4.2 G/DL (ref 3.5–5.2)
ALP SERPL-CCNC: 64 U/L (ref 40–150)
ALT SERPL W P-5'-P-CCNC: 65 U/L (ref 0–50)
ANION GAP SERPL CALCULATED.3IONS-SCNC: 11 MMOL/L (ref 7–15)
AST SERPL W P-5'-P-CCNC: 46 U/L (ref 0–45)
BILIRUB SERPL-MCNC: 0.4 MG/DL
BUN SERPL-MCNC: 9.2 MG/DL (ref 6–20)
CALCIUM SERPL-MCNC: 9.4 MG/DL (ref 8.8–10.4)
CHLORIDE SERPL-SCNC: 100 MMOL/L (ref 98–107)
CREAT SERPL-MCNC: 0.54 MG/DL (ref 0.51–0.95)
CRP SERPL-MCNC: <3 MG/L
EGFRCR SERPLBLD CKD-EPI 2021: >90 ML/MIN/1.73M2
GLUCOSE SERPL-MCNC: 120 MG/DL (ref 70–99)
HBV DNA SERPL NAA+PROBE-ACNC: 973 IU/ML
HBV DNA SERPL NAA+PROBE-LOG IU: 3 {LOG_IU}/ML
HCO3 SERPL-SCNC: 27 MMOL/L (ref 22–29)
POTASSIUM SERPL-SCNC: 4 MMOL/L (ref 3.4–5.3)
PROT SERPL-MCNC: 7.1 G/DL (ref 6.4–8.3)
SODIUM SERPL-SCNC: 138 MMOL/L (ref 135–145)

## 2025-05-27 ENCOUNTER — LAB (OUTPATIENT)
Dept: LAB | Facility: CLINIC | Age: 55
End: 2025-05-27
Payer: COMMERCIAL

## 2025-05-27 DIAGNOSIS — B18.1 VIRAL HEPATITIS B CHRONIC (H): ICD-10-CM

## 2025-05-27 LAB
ALBUMIN SERPL BCG-MCNC: 4 G/DL (ref 3.5–5.2)
ALP SERPL-CCNC: 80 U/L (ref 40–150)
ALT SERPL W P-5'-P-CCNC: 40 U/L (ref 0–50)
ANION GAP SERPL CALCULATED.3IONS-SCNC: 9 MMOL/L (ref 7–15)
AST SERPL W P-5'-P-CCNC: 32 U/L (ref 0–45)
BASOPHILS # BLD AUTO: 0 10E3/UL (ref 0–0.2)
BASOPHILS NFR BLD AUTO: 0 %
BILIRUB SERPL-MCNC: 0.3 MG/DL
BUN SERPL-MCNC: 7.3 MG/DL (ref 6–20)
CALCIUM SERPL-MCNC: 9 MG/DL (ref 8.8–10.4)
CHLORIDE SERPL-SCNC: 102 MMOL/L (ref 98–107)
CREAT SERPL-MCNC: 0.48 MG/DL (ref 0.51–0.95)
CRP SERPL-MCNC: <3 MG/L
EGFRCR SERPLBLD CKD-EPI 2021: >90 ML/MIN/1.73M2
EOSINOPHIL # BLD AUTO: 0.2 10E3/UL (ref 0–0.7)
EOSINOPHIL NFR BLD AUTO: 3 %
ERYTHROCYTE [DISTWIDTH] IN BLOOD BY AUTOMATED COUNT: 12.9 % (ref 10–15)
GLUCOSE SERPL-MCNC: 88 MG/DL (ref 70–99)
HCO3 SERPL-SCNC: 27 MMOL/L (ref 22–29)
HCT VFR BLD AUTO: 40.1 % (ref 35–47)
HGB BLD-MCNC: 13.6 G/DL (ref 11.7–15.7)
IMM GRANULOCYTES # BLD: 0 10E3/UL
IMM GRANULOCYTES NFR BLD: 0 %
LYMPHOCYTES # BLD AUTO: 2.3 10E3/UL (ref 0.8–5.3)
LYMPHOCYTES NFR BLD AUTO: 51 %
MCH RBC QN AUTO: 32.1 PG (ref 26.5–33)
MCHC RBC AUTO-ENTMCNC: 33.9 G/DL (ref 31.5–36.5)
MCV RBC AUTO: 95 FL (ref 78–100)
MONOCYTES # BLD AUTO: 0.5 10E3/UL (ref 0–1.3)
MONOCYTES NFR BLD AUTO: 10 %
NEUTROPHILS # BLD AUTO: 1.6 10E3/UL (ref 1.6–8.3)
NEUTROPHILS NFR BLD AUTO: 36 %
NRBC # BLD AUTO: 0 10E3/UL
NRBC BLD AUTO-RTO: 0 /100
PLATELET # BLD AUTO: 174 10E3/UL (ref 150–450)
POTASSIUM SERPL-SCNC: 4 MMOL/L (ref 3.4–5.3)
PROT SERPL-MCNC: 7 G/DL (ref 6.4–8.3)
RBC # BLD AUTO: 4.24 10E6/UL (ref 3.8–5.2)
SODIUM SERPL-SCNC: 138 MMOL/L (ref 135–145)
WBC # BLD AUTO: 4.6 10E3/UL (ref 4–11)

## 2025-05-27 PROCEDURE — 80053 COMPREHEN METABOLIC PANEL: CPT

## 2025-05-27 PROCEDURE — 86140 C-REACTIVE PROTEIN: CPT

## 2025-05-27 PROCEDURE — 87517 HEPATITIS B DNA QUANT: CPT

## 2025-05-27 PROCEDURE — 85018 HEMOGLOBIN: CPT

## 2025-05-27 PROCEDURE — 36415 COLL VENOUS BLD VENIPUNCTURE: CPT

## 2025-05-28 LAB
HBV DNA SERPL NAA+PROBE-ACNC: 74 IU/ML (ref ?–1)
HBV DNA SERPL NAA+PROBE-LOG IU: 1.9 {LOG_IU}/ML

## 2025-06-14 ENCOUNTER — HEALTH MAINTENANCE LETTER (OUTPATIENT)
Age: 55
End: 2025-06-14

## (undated) DEVICE — Device

## (undated) DEVICE — DRAPE UNDER BUTTOCK 8483

## (undated) DEVICE — GLOVE PROTEXIS W/NEU-THERA 6.5  2D73TE65

## (undated) DEVICE — GLOVE PROTEXIS BLUE W/NEU-THERA 7.0  2D73EB70

## (undated) DEVICE — PAD CHUX UNDERPAD 30X30"

## (undated) DEVICE — SOL NACL 0.9% IRRIG 500ML BOTTLE 2F7123

## (undated) DEVICE — LINEN TOWEL PACK X5 5464

## (undated) DEVICE — SUCTION TUBING

## (undated) DEVICE — IRRIGATION TUBING

## (undated) RX ORDER — PROPOFOL 10 MG/ML
INJECTION, EMULSION INTRAVENOUS
Status: DISPENSED
Start: 2018-05-02

## (undated) RX ORDER — ONDANSETRON 2 MG/ML
INJECTION INTRAMUSCULAR; INTRAVENOUS
Status: DISPENSED
Start: 2018-05-02

## (undated) RX ORDER — ACETAMINOPHEN 325 MG/1
TABLET ORAL
Status: DISPENSED
Start: 2018-05-02

## (undated) RX ORDER — FENTANYL CITRATE 50 UG/ML
INJECTION, SOLUTION INTRAMUSCULAR; INTRAVENOUS
Status: DISPENSED
Start: 2018-05-02

## (undated) RX ORDER — KETOROLAC TROMETHAMINE 30 MG/ML
INJECTION, SOLUTION INTRAMUSCULAR; INTRAVENOUS
Status: DISPENSED
Start: 2018-05-02

## (undated) RX ORDER — GABAPENTIN 300 MG/1
CAPSULE ORAL
Status: DISPENSED
Start: 2018-05-02

## (undated) RX ORDER — DEXAMETHASONE SODIUM PHOSPHATE 4 MG/ML
INJECTION, SOLUTION INTRA-ARTICULAR; INTRALESIONAL; INTRAMUSCULAR; INTRAVENOUS; SOFT TISSUE
Status: DISPENSED
Start: 2018-05-02

## (undated) RX ORDER — LIDOCAINE HYDROCHLORIDE 10 MG/ML
INJECTION, SOLUTION EPIDURAL; INFILTRATION; INTRACAUDAL; PERINEURAL
Status: DISPENSED
Start: 2018-05-02

## (undated) RX ORDER — OXYCODONE HYDROCHLORIDE 5 MG/1
TABLET ORAL
Status: DISPENSED
Start: 2018-05-02

## (undated) RX ORDER — LIDOCAINE HYDROCHLORIDE 20 MG/ML
INJECTION, SOLUTION EPIDURAL; INFILTRATION; INTRACAUDAL; PERINEURAL
Status: DISPENSED
Start: 2018-05-02